# Patient Record
Sex: FEMALE | Race: ASIAN | Employment: FULL TIME | URBAN - METROPOLITAN AREA
[De-identification: names, ages, dates, MRNs, and addresses within clinical notes are randomized per-mention and may not be internally consistent; named-entity substitution may affect disease eponyms.]

---

## 2017-01-16 ENCOUNTER — TELEPHONE (OUTPATIENT)
Dept: OBGYN | Facility: CLINIC | Age: 42
End: 2017-01-16

## 2017-01-16 DIAGNOSIS — R30.0 BURNING WITH URINATION: ICD-10-CM

## 2017-01-16 NOTE — TELEPHONE ENCOUNTER
Pt requesting lab slip for Urine Analysis  C/o burning with urination   Pended UA orders  Routed to Dr Cox

## 2017-01-19 ENCOUNTER — TELEPHONE (OUTPATIENT)
Dept: OBGYN | Facility: CLINIC | Age: 42
End: 2017-01-19

## 2017-01-19 NOTE — TELEPHONE ENCOUNTER
----- Message from Babar Cox M.D. sent at 1/18/2017 12:20 PM PST -----  Repeort normal urine culture. If stil symptomatic , recommend Azo-standard OTC a, cranberry jucie

## 2017-02-16 ENCOUNTER — OFFICE VISIT (OUTPATIENT)
Dept: BEHAVIORAL HEALTH | Facility: PHYSICIAN GROUP | Age: 42
End: 2017-02-16
Payer: COMMERCIAL

## 2017-02-16 VITALS
DIASTOLIC BLOOD PRESSURE: 94 MMHG | BODY MASS INDEX: 26.63 KG/M2 | WEIGHT: 156 LBS | HEART RATE: 81 BPM | HEIGHT: 64 IN | SYSTOLIC BLOOD PRESSURE: 136 MMHG

## 2017-02-16 DIAGNOSIS — F32.0 MDD (MAJOR DEPRESSIVE DISORDER), SINGLE EPISODE, MILD (HCC): Primary | ICD-10-CM

## 2017-02-16 PROBLEM — F33.0 MDD (MAJOR DEPRESSIVE DISORDER), RECURRENT EPISODE, MILD (HCC): Status: RESOLVED | Noted: 2017-02-16 | Resolved: 2017-02-16

## 2017-02-16 PROBLEM — F33.0 MDD (MAJOR DEPRESSIVE DISORDER), RECURRENT EPISODE, MILD (HCC): Status: ACTIVE | Noted: 2017-02-16

## 2017-02-16 PROCEDURE — 99204 OFFICE O/P NEW MOD 45 MIN: CPT | Performed by: PSYCHIATRY & NEUROLOGY

## 2017-02-16 RX ORDER — FLUOXETINE HYDROCHLORIDE 20 MG/1
20 CAPSULE ORAL DAILY
Qty: 30 CAP | Refills: 1 | Status: SHIPPED | OUTPATIENT
Start: 2017-02-16 | End: 2017-07-05

## 2017-02-16 NOTE — PROGRESS NOTES
"INITIAL PSYCHIATRY EVALUATION      Chief Complaint   Patient presents with   • Establish Care   • Depression         History Of Present Illness:  Nancy Carcamo is a 41 y.o. old female with no prior psychiatric history comes in today for evaluation of depression. She endorses feeling more irritable and agitated at least for the last year or so. She feels that she might be \"bipolar\" because of her mood swings. She gets more irritable with her kids and feels that she has lost her patience with them. She endorses significant marital stress. She has been  for 19 years and her  went to [place] for 2 years where he had an affair. He has been back for about 2 years now and that is when she found out. But he refuses to talk to her about that affair and acts like nothing has happened. She feels that the trust and the marriage has been lost but she wants to still try for the sake of kids. She has thought about divorce and has really good support from her parents and siblings but her  does not want that. Her  has problems with alcohol as well but he is not in treatment for that as well. She feels that in the last 2 years her marriage has really deteriorated into a fight a lot, \"we are more like roommates now\". Her kids and family are aware of the marital problems. She endorses feeling more irritable than depressed. She also reports feeling more emotional and having crying spells, struggling with energy and poor motivation. Denies anhedonia, enjoys dancing and spending time with her family. Appetite and sleep have been good. Denies any problems making decisions for herself. She recently had a breakdown at work and had to come back home early because of that. Denies any self-harm behaviors. Denies passive or active thoughts of wanting to hurt herself or others. Denies prior current symptoms of anxiety, hypomania/chantel or psychosis.    Current psychiatric medications - None    Past Psychiatric " History:  Denies history of suicide attempt or prior inpatient psychiatry hospitalization.  Previous medication trials - None    Past Medical/Surgical History:  Past Medical History   Diagnosis Date   • Diabetes 2006     With last pregnancy    • Snoring    • Cyst, ovarian      and fibroids   • OAB (overactive bladder) 10/18/2016     Past Surgical History   Procedure Laterality Date   • Cystectomy  2002     ovarian   • Ovarian cystectomy Bilateral 8/4/2016     Procedure: CYSTOSCOPY;  Surgeon: Delilah Ware M.D.;  Location: SURGERY SAME DAY [facility] ORS;  Service:    • Vaginal hysterectomy scope total Bilateral 8/4/2016     Procedure: VAGINAL HYSTERECTOMY SCOPE TOTAL, EUA, LSO & RT SALPINGECTOMY;  Surgeon: Delilah Ware M.D.;  Location: SURGERY SAME DAY [facility] ORS;  Service:        Family Psychiatric History:  17 yo daughter - anxiety, not currently on medications   16 yo daughter - depression, Prozac  Brother - possible schizophrenia    Substance Use/Addiction History:  Alcohol - Infrequent drinking  Nicotine - Denies  Illicit drugs - Denies    Social History:  Childhood - Born in [place] and moved to [place] when she was a teenager. She is the oldest of 5 siblings and has 3 younger sisters and a younger brother. She reports having a good childhood.  Employment - Employed at [employer] x 9 years. 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  Relationship/Kids -  x 19 years and has 4 daughters and one son.   Current living situation - Lives with her  and kids and Hector.    Allergies:  Nkda    Medications:  Current Outpatient Prescriptions   Medication Sig Dispense Refill   • fluoxetine (PROZAC) 20 MG Cap Take 1 Cap by mouth every day. 30 Cap 1   • tolterodine ER (DETROL LA) 4 MG CAPSULE SR 24 HR Take 1 Cap by mouth every day. 30 Cap 3   • bacitracin 500 UNIT/GM ointment Apply 1 Each to affected area(s) 2 times a day. 1 Tube 1     No current facility-administered medications for  "this visit.       Review of Symptoms:  Constitutional - Positive for fatigue  Eyes - Negative for blurry vision  HEENT - Negative for sore throat  Respiratory - Negative for shortness of breath, cough  CVS - Negative for chest pain, palpitations  GI - Negative for nausea, vomiting, abdominal pain, diarrhea, constipation  Skin - Negative for rash  Musculoskeletal - Negative for back pain  Neurological - Negative for headaches  Psychiatric - Positive for depression    Physical Examination:  Vital signs: /94 mmHg  Pulse 81  Ht 1.626 m (5' 4\")  Wt 70.761 kg (156 lb)  BMI 26.76 kg/m2  LMP 03/23/2011    Musculoskeletal: Normal gait. No abnormal movements.     Mental Status Evaluation:   General: Middle aged  female, tearful several times, dressed in casual attire, good grooming and hygiene, in no apparent distress, calm and cooperative, good eye contact, no psychomotor agitation or retardation  Orientation: Alert and oriented to person, place and time  Recent and remote memory: Intact  Attention span and concentration: Intact  Speech: Spontaneous, normal rate, rhythm and tone  Thought Process: Linear, logical and goal directed  Thought Content: Denies suicidal or homicidal ideations, intent or plan  Perception: Denies auditory or visual hallucinations. No delusions noted  Associations: Intact  Language: Appropriate  Fund of knowledge and vocabulary: Adequate  Mood: \"fine\"  Affect: Dysphroic, mood congruent  Insight: Good  Judgment: Good    Impression:  1. Major depressive disorder, single episode, mild    Medical Records/Labs/Diagnostic Tests Reviewed:  [place] [facility] records - one prescription of controledl medication in 2016    Plan:  1. Start Prozac 20 mg daily for depression. Discussed for 6 week period to assess for efficacy. Discussed side effects including nausea/vomiting, abdominal discomfort/pain, diarrhea, headaches, drowsiness, sleep disturbances, initial worsening of anxiety symptoms, decreased libido, " difficulty achieving orgasm etc.  2. Discussed marital counseling and she is agreeable and will talk to her  about the same. Her marital stress seems to be contributing a lot to her mood and she would benefit from marital counseling.    Return to clinic in 6 weeks or sooner if symptoms worsen    Sulema Alejandro M.D.  02/16/2017    This note was created using voice recognition software (Dragon). The accuracy of the dictation is limited by the abilities of the software. I have reviewed the note prior to signing, however some errors in grammar and context are still possible. If you have any questions related to this note please do not hesitate to contact our office.

## 2017-02-28 ENCOUNTER — HOSPITAL ENCOUNTER (OUTPATIENT)
Dept: LAB | Facility: MEDICAL CENTER | Age: 42
End: 2017-02-28
Attending: FAMILY MEDICINE
Payer: COMMERCIAL

## 2017-02-28 LAB
25(OH)D3 SERPL-MCNC: 14 NG/ML (ref 30–100)
ALBUMIN SERPL BCP-MCNC: 4.5 G/DL (ref 3.2–4.9)
ALBUMIN/GLOB SERPL: 1.2 G/DL
ALP SERPL-CCNC: 62 U/L (ref 30–99)
ALT SERPL-CCNC: 19 U/L (ref 2–50)
ANION GAP SERPL CALC-SCNC: 9 MMOL/L (ref 0–11.9)
AST SERPL-CCNC: 19 U/L (ref 12–45)
BILIRUB SERPL-MCNC: 0.7 MG/DL (ref 0.1–1.5)
BUN SERPL-MCNC: 8 MG/DL (ref 8–22)
CALCIUM SERPL-MCNC: 9.8 MG/DL (ref 8.5–10.5)
CHLORIDE SERPL-SCNC: 105 MMOL/L (ref 96–112)
CO2 SERPL-SCNC: 24 MMOL/L (ref 20–33)
CREAT SERPL-MCNC: 0.64 MG/DL (ref 0.5–1.4)
ERYTHROCYTE [DISTWIDTH] IN BLOOD BY AUTOMATED COUNT: 47.8 FL (ref 35.9–50)
EST. AVERAGE GLUCOSE BLD GHB EST-MCNC: 140 MG/DL
GLOBULIN SER CALC-MCNC: 3.9 G/DL (ref 1.9–3.5)
GLUCOSE SERPL-MCNC: 113 MG/DL (ref 65–99)
HBA1C MFR BLD: 6.5 % (ref 0–5.6)
HCT VFR BLD AUTO: 44.7 % (ref 37–47)
HGB BLD-MCNC: 14.2 G/DL (ref 12–16)
MCH RBC QN AUTO: 27.3 PG (ref 27–33)
MCHC RBC AUTO-ENTMCNC: 31.8 G/DL (ref 33.6–35)
MCV RBC AUTO: 86 FL (ref 81.4–97.8)
PLATELET # BLD AUTO: 358 K/UL (ref 164–446)
PMV BLD AUTO: 11 FL (ref 9–12.9)
POTASSIUM SERPL-SCNC: 3.4 MMOL/L (ref 3.6–5.5)
PROT SERPL-MCNC: 8.4 G/DL (ref 6–8.2)
RBC # BLD AUTO: 5.2 M/UL (ref 4.2–5.4)
SODIUM SERPL-SCNC: 138 MMOL/L (ref 135–145)
TSH SERPL DL<=0.005 MIU/L-ACNC: 2.45 UIU/ML (ref 0.3–3.7)
WBC # BLD AUTO: 8 K/UL (ref 4.8–10.8)

## 2017-02-28 PROCEDURE — 83695 ASSAY OF LIPOPROTEIN(A): CPT

## 2017-02-28 PROCEDURE — 84443 ASSAY THYROID STIM HORMONE: CPT

## 2017-02-28 PROCEDURE — 80061 LIPID PANEL: CPT

## 2017-02-28 PROCEDURE — 83036 HEMOGLOBIN GLYCOSYLATED A1C: CPT

## 2017-02-28 PROCEDURE — 82306 VITAMIN D 25 HYDROXY: CPT

## 2017-02-28 PROCEDURE — 80053 COMPREHEN METABOLIC PANEL: CPT

## 2017-02-28 PROCEDURE — 83704 LIPOPROTEIN BLD QUAN PART: CPT

## 2017-02-28 PROCEDURE — 85027 COMPLETE CBC AUTOMATED: CPT

## 2017-02-28 PROCEDURE — 36415 COLL VENOUS BLD VENIPUNCTURE: CPT

## 2017-03-02 LAB — LPA SERPL-MCNC: 58 MG/DL

## 2017-03-03 LAB
CHOLEST SERPL-MCNC: 177 MG/DL (ref 100–199)
HDL PARTICAL NO Q4363: 25.9 UMOL/L
HDL SERPL QN: 8.5 NM
HDLC SERPL-MCNC: 34 MG/DL
HLD.LARGE SERPL-SCNC: 1.6 UMOL/L
LDL MED SERPL QN: 20.6 NM
LDL SERPL QN: 20.6 NM
LDL SERPL-SCNC: 1488 NMOL/L
LDL SMALL SERPL-SCNC: 643 NMOL/L
LDL SMALL SERPL-SCNC: 643 NMOL/L
LDLC SERPL CALC-MCNC: 122 MG/DL (ref 0–99)
LP IR SCORE Q4364: 74
TRIGL SERPL-MCNC: 104 MG/DL (ref 0–149)
VLDL LARGE SERPL-SCNC: 5.1 NMOL/L
VLDL SERPL QN: 47.4 NM

## 2017-03-15 ENCOUNTER — APPOINTMENT (OUTPATIENT)
Dept: PLASTIC SURGERY | Facility: IMAGING CENTER | Age: 42
End: 2017-03-15

## 2017-04-03 ENCOUNTER — OFFICE VISIT (OUTPATIENT)
Dept: MEDICAL GROUP | Facility: PHYSICIAN GROUP | Age: 42
End: 2017-04-03
Payer: COMMERCIAL

## 2017-04-03 ENCOUNTER — TELEPHONE (OUTPATIENT)
Dept: MEDICAL GROUP | Facility: PHYSICIAN GROUP | Age: 42
End: 2017-04-03

## 2017-04-03 VITALS
HEART RATE: 85 BPM | RESPIRATION RATE: 16 BRPM | SYSTOLIC BLOOD PRESSURE: 118 MMHG | HEIGHT: 64 IN | OXYGEN SATURATION: 96 % | DIASTOLIC BLOOD PRESSURE: 84 MMHG | BODY MASS INDEX: 24.92 KG/M2 | WEIGHT: 146 LBS | TEMPERATURE: 98.4 F

## 2017-04-03 DIAGNOSIS — E55.9 VITAMIN D DEFICIENCY: ICD-10-CM

## 2017-04-03 DIAGNOSIS — E78.5 HYPERLIPIDEMIA, UNSPECIFIED HYPERLIPIDEMIA TYPE: ICD-10-CM

## 2017-04-03 DIAGNOSIS — E11.9 NON-INSULIN DEPENDENT TYPE 2 DIABETES MELLITUS (HCC): ICD-10-CM

## 2017-04-03 PROCEDURE — 99214 OFFICE O/P EST MOD 30 MIN: CPT | Performed by: INTERNAL MEDICINE

## 2017-04-03 RX ORDER — LANCETS 30 GAUGE
EACH MISCELLANEOUS
Qty: 100 EACH | Refills: 3 | Status: SHIPPED | OUTPATIENT
Start: 2017-04-03 | End: 2021-08-20

## 2017-04-03 ASSESSMENT — PATIENT HEALTH QUESTIONNAIRE - PHQ9: CLINICAL INTERPRETATION OF PHQ2 SCORE: 0

## 2017-04-03 NOTE — MR AVS SNAPSHOT
"        Nancy Carcamo   4/3/2017 1:25 PM   Office Visit   MRN: [id number]    Department:  [facility]   Dept Phone:  [phone]    Description:  Female : 1975   Provider:  Natalie Cesar D.O.           Reason for Visit     Follow-Up Lab review      Allergies as of 4/3/2017     Allergen Noted Reactions    Nkda [No Known Drug Allergy] 2010         You were diagnosed with     Non-insulin dependent type 2 diabetes mellitus (CMS-HCC)   [315330]       Hyperlipidemia, unspecified hyperlipidemia type   [5968711]       Vitamin D deficiency   [3429146]         Vital Signs     Blood Pressure Pulse Temperature Respirations Height Weight    118/84 mmHg 85 36.9 °C (98.4 °F) 16 1.626 m (5' 4\") 66.225 kg (146 lb)    Body Mass Index Oxygen Saturation Last Menstrual Period Breastfeeding? Smoking Status       25.05 kg/m2 96% 2011 No Never Smoker        Basic Information     Date Of Birth Sex Race Ethnicity Preferred Language    1975 Female  Unknown English      Problem List              ICD-10-CM Priority Class Noted - Resolved    Vitamin D deficiency E55.9   2015 - Present    Ovarian retention cyst N83.209   2016 - Present    Menorrhagia, premenopausal N92.4   2016 - Present    Pelvic and perineal pain R10.2   2016 - Present    OAB (overactive bladder) N32.81   10/18/2016 - Present    MDD (major depressive disorder), single episode, mild (CMS-HCC) F32.0   2017 - Present      Health Maintenance        Date Due Completion Dates    IMM DTaP/Tdap/Td Vaccine (1 - Tdap) 3/27/1992 ---    PAP SMEAR 2017, 1/10/2012, 3/11/2011    MAMMOGRAM 2017, 10/18/2016 (Done), 2015, 2014    Override on 10/18/2016: Done            Current Immunizations     Influenza TIV (IM) 2012, 12/10/2011  5:00 AM    Influenza Vaccine Quad Inj (Pf) 2016, 2014    Influenza Vaccine Quad Inj (Preserved) 2015    Tuberculin Skin Test 2014      "   Below and/or attached are the medications your provider expects you to take. Review all of your home medications and newly ordered medications with your provider and/or pharmacist. Follow medication instructions as directed by your provider and/or pharmacist. Please keep your medication list with you and share with your provider. Update the information when medications are discontinued, doses are changed, or new medications (including over-the-counter products) are added; and carry medication information at all times in the event of emergency situations     Allergies:  NKDA - (reactions not documented)               Medications  Valid as of: April 03, 2017 -  2:09 PM    Generic Name Brand Name Tablet Size Instructions for use    Bacitracin (Ointment) bacitracin 500 UNIT/GM Apply 1 Each to affected area(s) 2 times a day.        FLUoxetine HCl (Cap) PROZAC 20 MG Take 1 Cap by mouth every day.        MetFORMIN HCl (Tab) GLUCOPHAGE 500 MG Take 1 Tab by mouth 2 times a day, with meals.        Tolterodine Tartrate (CAPSULE SR 24 HR) DETROL LA 4 MG Take 1 Cap by mouth every day.        .                 Medicines prescribed today were sent to:     [id number] - [place], [place] - [address]    [address] [address]    Phone: [phone] Fax: [phone]    Open 24 Hours?: No      Medication refill instructions:       If your prescription bottle indicates you have medication refills left, it is not necessary to call your provider’s office. Please contact your pharmacy and they will refill your medication.    If your prescription bottle indicates you do not have any refills left, you may request refills at any time through one of the following ways: The online [employer] [facility] system (except Urgent Care), by calling your provider’s office, or by asking your pharmacy to contact your provider’s office with a refill request. Medication refills are processed only during regular business hours and may  not be available until the next business day. Your provider may request additional information or to have a follow-up visit with you prior to refilling your medication.   *Please Note: Medication refills are assigned a new Rx number when refilled electronically. Your pharmacy may indicate that no refills were authorized even though a new prescription for the same medication is available at the pharmacy. Please request the medicine by name with the pharmacy before contacting your provider for a refill.        Your To Do List     Future Labs/Procedures Complete By Expires    LIPID PROFILE  7/3/2017 (Approximate) 4/4/2018    VITAMIN D,25 HYDROXY  7/3/2017 (Approximate) 4/4/2018    BASIC METABOLIC PANEL  As directed 4/4/2018    HEMOGLOBIN A1C  As directed 4/4/2018      Referral     A referral request has been sent to our patient care coordination department. Please allow 3-5 business days for us to process this request and contact you either by phone or mail. If you do not hear from us by the 5th business day, please call us at [phone].           Claim Maps Access Code: Activation code not generated  Current Claim Maps Status: Active

## 2017-04-03 NOTE — PROGRESS NOTES
Subjective:   Nancy Carcamo is a 42 y.o. female here today for multiple problems as listed below.      Patient had history of impaired fasting glucose. Had lab tests ordered through [facility] program at [facility].  Here to review lab results. A1C is elevated compared to previous.  Lab Results   Component Value Date/Time    GLYCOHEMOGLOBIN 6.5* 02/28/2017 08:24 AM    GLYCOHEMOGLOBIN 6.3* 12/22/2015 05:33 PM   Patient has history of gestational diabetes with her last 2 pregnancies-last one in 2010. No family history of diabetes. Through the [facility] program, she has been working out 3 times a week with some intense workouts (P90 X) as well as walking about 30 minutes during her lunch break. He is also been watching her diet more closely-she doesn't eat out and makes her lunches and dinners. She has been trying to lose weight and lost about 10 pounds so far with the program. She's not sure if she is able to continue lose weight but hopes that she can reach her goal weight of 140 pounds. She is interested in weight loss medications. She is following with a nutritionist through the program. Thyroid tests were within normal limits with 2/2017 labs.  Patients most recent cholesterol was reviewed:  Lab Results   Component Value Date/Time    CHOLESTEROL, 02/28/2017 08:24 AM    CHOLESTEROL, 08/21/2015 09:21 AM     Lab Results   Component Value Date/Time    * 08/21/2015 09:21 AM    LDL 95 07/22/2014 08:08 AM     Lab Results   Component Value Date/Time    HDL 34* 02/28/2017 08:24 AM    HDL 32* 08/21/2015 09:21 AM     Lab Results   Component Value Date/Time    TRIGLYCERIDES 104 02/28/2017 08:24 AM    TRIGLYCERIDES 95 08/21/2015 09:21 AM       Lab Results   Component Value Date/Time    25-HYDROXY   VITAMIN D 25 14* 02/28/2017 08:24 AM    25-HYDROXY   VITAMIN D 25 16* 08/21/2015 09:21 AM       Current medicines (including changes today)  Current Outpatient Prescriptions   Medication Sig Dispense Refill   •  "metformin (GLUCOPHAGE) 500 MG Tab Take 1 Tab by mouth 2 times a day, with meals. 60 Tab 2   • Lancets Misc Lancets order: Lancets for Abbott Freestyle Lite meter. Sig: use daily and prn ssx high or low sugar. 100 Each 3   • Blood Glucose Monitoring Suppl SUPPLIES Misc Test strips order: Test strips for Abbott Freestyle Lite meter. Sig: use Daily and prn ssx high or low sugar 100 Each 3   • Blood Glucose Monitoring Suppl Device Meter: Dispense Abbott Freestyle Lite meter. Sig. Use daily as directed for blood sugar monitoring. 1 Device 0   • tolterodine ER (DETROL LA) 4 MG CAPSULE SR 24 HR Take 1 Cap by mouth every day. 30 Cap 3   • fluoxetine (PROZAC) 20 MG Cap Take 1 Cap by mouth every day. 30 Cap 1   • bacitracin 500 UNIT/GM ointment Apply 1 Each to affected area(s) 2 times a day. 1 Tube 1     No current facility-administered medications for this visit.     She  has a past medical history of Diabetes (2006); Snoring; Cyst, ovarian; and OAB (overactive bladder) (10/18/2016).    ROS   No chest pain, no shortness of breath, no abdominal pain, no diarrhea/constipation, no urinary symptoms.     Objective:     Blood pressure 118/84, pulse 85, temperature 36.9 °C (98.4 °F), resp. rate 16, height 1.626 m (5' 4\"), weight 66.225 kg (146 lb), last menstrual period 03/23/2011, SpO2 96 %, not currently breastfeeding. Body mass index is 25.05 kg/(m^2).   Physical Exam:  Alert, oriented in no acute distress.  Eye contact is good, speech goal directed, affect calm  HEENT: conjunctiva non-injected, sclera non-icteric.  Oral mucous membranes pink and moist with no lesions.  Neck No adenopathy or masses in the neck or supraclavicular regions.  Lungs: clear to auscultation bilaterally with good excursion.  CV: regular rate and rhythm.  Ext: no edema, color normal, vascularity normal, temperature normal    Assessment and Plan:   The following treatment plan was discussed   1. Non-insulin dependent type 2 diabetes mellitus (CMS-HCC)  " HEMOGLOBIN A1C    BASIC METABOLIC PANEL    REFERRAL TO DIABETIC EDUCATION Diabetes Self Management Education / Training (DSME/T) and Medical Nutrition Therapy (MNT): Initial Group DSME/MNT as authorized by payor, Follow-Up DSME/MNT as authorized by payor; DSME/T Content: Monitoring Diabetes,     metformin (GLUCOPHAGE) 500 MG Tab    Lancets Misc    Blood Glucose Monitoring Suppl SUPPLIES Misc    Blood Glucose Monitoring Suppl Device   2. Hyperlipidemia, unspecified hyperlipidemia type  LIPID PROFILE   3. Vitamin D deficiency  VITAMIN D,25 HYDROXY     1. New diagnosis of diabetes however, we will repeat A1C and fasting sugars to confirm.   Discussed diet, exercise, disease management and weight loss goals.   Education and advise provided today:   All medications, side effects and compliance (discussed carefully)  Home glucose monitoring  Diabetic diet discussed in detail  Labs immediately prior to next visit  Long term diabetic complications  Low cholesterol diet, weight control and daily exercise  Referral to Diabetic Education Department  -Patient may experience weight loss with starting metformin  2. Recheck fasting lipid panel in 3 months. Discussed healthy diet regular physical activity.  3. Start 1500 units vitamin D3 daily. Recheck vitamin D levels in 3 months.    Followup:     Followup: Return in about 3 months (around 7/3/2017) for follow-up with PCP, Lab review, establish appointment with new provider..         Please note that dictation has been dictated using voice recognition soft ware. I have made every reasonable attempt to correct obvious errors, but I expect that there are errors of grammar and possibly content that I did not discover before finalizing the note.

## 2017-04-03 NOTE — TELEPHONE ENCOUNTER
Please inform patient I forgot to mention that I also sent script to her pharmacy for blood glucose monitoring supplies - this can be reviewed in detail at diabetic education class and with pharmacist on how to use it.

## 2017-04-04 DIAGNOSIS — E66.9 OBESITY, UNSPECIFIED OBESITY SEVERITY, UNSPECIFIED OBESITY TYPE: ICD-10-CM

## 2017-04-04 RX ORDER — PHENTERMINE HYDROCHLORIDE 37.5 MG/1
37.5 TABLET ORAL
Qty: 30 TAB | Refills: 2 | Status: SHIPPED
Start: 2017-04-04 | End: 2021-08-20

## 2017-04-07 ENCOUNTER — HOSPITAL ENCOUNTER (OUTPATIENT)
Dept: LAB | Facility: MEDICAL CENTER | Age: 42
End: 2017-04-07
Attending: INTERNAL MEDICINE
Payer: COMMERCIAL

## 2017-04-07 ENCOUNTER — APPOINTMENT (OUTPATIENT)
Dept: LAB | Facility: MEDICAL CENTER | Age: 42
End: 2017-04-07
Payer: COMMERCIAL

## 2017-04-07 DIAGNOSIS — E11.9 NON-INSULIN DEPENDENT TYPE 2 DIABETES MELLITUS (HCC): ICD-10-CM

## 2017-04-07 DIAGNOSIS — R73.01 IMPAIRED FASTING GLUCOSE: ICD-10-CM

## 2017-04-07 LAB
ANION GAP SERPL CALC-SCNC: 7 MMOL/L (ref 0–11.9)
BUN SERPL-MCNC: 10 MG/DL (ref 8–22)
CALCIUM SERPL-MCNC: 9.5 MG/DL (ref 8.5–10.5)
CHLORIDE SERPL-SCNC: 104 MMOL/L (ref 96–112)
CO2 SERPL-SCNC: 27 MMOL/L (ref 20–33)
CREAT SERPL-MCNC: 0.75 MG/DL (ref 0.5–1.4)
EST. AVERAGE GLUCOSE BLD GHB EST-MCNC: 131 MG/DL
GFR SERPL CREATININE-BSD FRML MDRD: >60 ML/MIN/1.73 M 2
GLUCOSE SERPL-MCNC: 104 MG/DL (ref 65–99)
HBA1C MFR BLD: 6.2 % (ref 0–5.6)
POTASSIUM SERPL-SCNC: 4.2 MMOL/L (ref 3.6–5.5)
SODIUM SERPL-SCNC: 138 MMOL/L (ref 135–145)

## 2017-04-07 PROCEDURE — 83036 HEMOGLOBIN GLYCOSYLATED A1C: CPT

## 2017-04-07 PROCEDURE — 80048 BASIC METABOLIC PNL TOTAL CA: CPT

## 2017-04-07 PROCEDURE — 36415 COLL VENOUS BLD VENIPUNCTURE: CPT

## 2017-05-08 ENCOUNTER — TELEPHONE (OUTPATIENT)
Dept: OBGYN | Facility: CLINIC | Age: 42
End: 2017-05-08

## 2017-05-08 ENCOUNTER — HOSPITAL ENCOUNTER (OUTPATIENT)
Dept: LAB | Facility: MEDICAL CENTER | Age: 42
End: 2017-05-08
Attending: PEDIATRICS
Payer: COMMERCIAL

## 2017-05-08 DIAGNOSIS — R39.9 UTI SYMPTOMS: ICD-10-CM

## 2017-05-08 LAB
APPEARANCE UR: ABNORMAL
BACTERIA #/AREA URNS HPF: ABNORMAL /HPF
BILIRUB UR QL STRIP.AUTO: NEGATIVE
COLOR UR: YELLOW
CULTURE IF INDICATED INDCX: YES UA CULTURE
EPI CELLS #/AREA URNS HPF: ABNORMAL /HPF
GLUCOSE UR STRIP.AUTO-MCNC: NEGATIVE MG/DL
KETONES UR STRIP.AUTO-MCNC: NEGATIVE MG/DL
LEUKOCYTE ESTERASE UR QL STRIP.AUTO: ABNORMAL
MICRO URNS: ABNORMAL
MUCOUS THREADS #/AREA URNS HPF: ABNORMAL /HPF
NITRITE UR QL STRIP.AUTO: POSITIVE
PH UR STRIP.AUTO: 6.5 [PH]
PROT UR QL STRIP: 30 MG/DL
RBC # URNS HPF: ABNORMAL /HPF
RBC UR QL AUTO: ABNORMAL
SP GR UR STRIP.AUTO: 1.01
WBC #/AREA URNS HPF: >150 /HPF

## 2017-05-08 PROCEDURE — 87086 URINE CULTURE/COLONY COUNT: CPT

## 2017-05-08 PROCEDURE — 87077 CULTURE AEROBIC IDENTIFY: CPT

## 2017-05-08 PROCEDURE — 81001 URINALYSIS AUTO W/SCOPE: CPT

## 2017-05-08 PROCEDURE — 87186 SC STD MICRODIL/AGAR DIL: CPT

## 2017-05-09 ENCOUNTER — TELEPHONE (OUTPATIENT)
Dept: OBGYN | Facility: CLINIC | Age: 42
End: 2017-05-09

## 2017-05-09 DIAGNOSIS — N39.0 URINARY TRACT INFECTION WITHOUT HEMATURIA, SITE UNSPECIFIED: ICD-10-CM

## 2017-05-09 RX ORDER — NITROFURANTOIN 25; 75 MG/1; MG/1
100 CAPSULE ORAL 2 TIMES DAILY
Qty: 14 CAP | Refills: 0 | Status: SHIPPED | OUTPATIENT
Start: 2017-05-09 | End: 2017-07-05

## 2017-05-09 RX ORDER — PHENAZOPYRIDINE HYDROCHLORIDE 200 MG/1
200 TABLET, FILM COATED ORAL 3 TIMES DAILY PRN
Qty: 6 TAB | Refills: 0 | Status: SHIPPED | OUTPATIENT
Start: 2017-05-09 | End: 2017-07-05

## 2017-05-10 LAB
BACTERIA UR CULT: ABNORMAL
SIGNIFICANT IND 70042: ABNORMAL
SOURCE SOURCE: ABNORMAL

## 2017-05-25 ENCOUNTER — HOSPITAL ENCOUNTER (OUTPATIENT)
Facility: MEDICAL CENTER | Age: 42
End: 2017-05-25
Attending: FAMILY MEDICINE
Payer: COMMERCIAL

## 2017-05-25 LAB — GFR SERPL CREATININE-BSD FRML MDRD: >60 ML/MIN/1.73 M 2

## 2017-05-25 PROCEDURE — 80061 LIPID PANEL: CPT

## 2017-05-25 PROCEDURE — 80053 COMPREHEN METABOLIC PANEL: CPT

## 2017-05-25 PROCEDURE — 85027 COMPLETE CBC AUTOMATED: CPT

## 2017-05-25 PROCEDURE — 83036 HEMOGLOBIN GLYCOSYLATED A1C: CPT

## 2017-05-25 PROCEDURE — 82306 VITAMIN D 25 HYDROXY: CPT

## 2017-05-25 PROCEDURE — 84443 ASSAY THYROID STIM HORMONE: CPT

## 2017-05-25 PROCEDURE — 83704 LIPOPROTEIN BLD QUAN PART: CPT

## 2017-05-25 PROCEDURE — 83695 ASSAY OF LIPOPROTEIN(A): CPT

## 2017-05-26 LAB
25(OH)D3 SERPL-MCNC: 12 NG/ML (ref 30–100)
ALBUMIN SERPL BCP-MCNC: 4.4 G/DL (ref 3.2–4.9)
ALBUMIN/GLOB SERPL: 1.3 G/DL
ALP SERPL-CCNC: 60 U/L (ref 30–99)
ALT SERPL-CCNC: 23 U/L (ref 2–50)
ANION GAP SERPL CALC-SCNC: 9 MMOL/L (ref 0–11.9)
AST SERPL-CCNC: 21 U/L (ref 12–45)
BILIRUB SERPL-MCNC: 0.4 MG/DL (ref 0.1–1.5)
BUN SERPL-MCNC: 10 MG/DL (ref 8–22)
CALCIUM SERPL-MCNC: 9.8 MG/DL (ref 8.5–10.5)
CHLORIDE SERPL-SCNC: 104 MMOL/L (ref 96–112)
CO2 SERPL-SCNC: 26 MMOL/L (ref 20–33)
CREAT SERPL-MCNC: 0.72 MG/DL (ref 0.5–1.4)
ERYTHROCYTE [DISTWIDTH] IN BLOOD BY AUTOMATED COUNT: 43.7 FL (ref 35.9–50)
EST. AVERAGE GLUCOSE BLD GHB EST-MCNC: 128 MG/DL
GLOBULIN SER CALC-MCNC: 3.4 G/DL (ref 1.9–3.5)
GLUCOSE SERPL-MCNC: 106 MG/DL (ref 65–99)
HBA1C MFR BLD: 6.1 % (ref 0–5.6)
HCT VFR BLD AUTO: 42.5 % (ref 37–47)
HGB BLD-MCNC: 13.1 G/DL (ref 12–16)
MCH RBC QN AUTO: 25.7 PG (ref 27–33)
MCHC RBC AUTO-ENTMCNC: 30.8 G/DL (ref 33.6–35)
MCV RBC AUTO: 83.5 FL (ref 81.4–97.8)
PLATELET # BLD AUTO: 347 K/UL (ref 164–446)
PMV BLD AUTO: 10.8 FL (ref 9–12.9)
POTASSIUM SERPL-SCNC: 3.5 MMOL/L (ref 3.6–5.5)
PROT SERPL-MCNC: 7.8 G/DL (ref 6–8.2)
RBC # BLD AUTO: 5.09 M/UL (ref 4.2–5.4)
SODIUM SERPL-SCNC: 139 MMOL/L (ref 135–145)
TSH SERPL DL<=0.005 MIU/L-ACNC: 3.23 UIU/ML (ref 0.3–3.7)
WBC # BLD AUTO: 7.9 K/UL (ref 4.8–10.8)

## 2017-05-27 LAB — LPA SERPL-MCNC: 68 MG/DL

## 2017-05-30 LAB
CHOLEST SERPL-MCNC: 182 MG/DL (ref 100–199)
HDL PARTICAL NO Q4363: 26.7 UMOL/L
HDL SERPL QN: 8.4 NM
HDLC SERPL-MCNC: 39 MG/DL
HLD.LARGE SERPL-SCNC: 1.9 UMOL/L
LDL MED SERPL QN: 20.8 NM
LDL SERPL QN: 20.8 NM
LDL SERPL-SCNC: 1606 NMOL/L
LDL SMALL SERPL-SCNC: 827 NMOL/L
LDL SMALL SERPL-SCNC: 827 NMOL/L
LDLC SERPL CALC-MCNC: 122 MG/DL (ref 0–99)
LP IR SCORE Q4364: 65
TRIGL SERPL-MCNC: 106 MG/DL (ref 0–149)
VLDL LARGE SERPL-SCNC: 3.7 NMOL/L
VLDL SERPL QN: 42.9 NM

## 2017-07-05 ENCOUNTER — OFFICE VISIT (OUTPATIENT)
Dept: MEDICAL GROUP | Facility: PHYSICIAN GROUP | Age: 42
End: 2017-07-05
Payer: COMMERCIAL

## 2017-07-05 VITALS
DIASTOLIC BLOOD PRESSURE: 82 MMHG | OXYGEN SATURATION: 96 % | SYSTOLIC BLOOD PRESSURE: 112 MMHG | WEIGHT: 141 LBS | HEART RATE: 86 BPM | TEMPERATURE: 97.9 F | HEIGHT: 64 IN | BODY MASS INDEX: 24.07 KG/M2

## 2017-07-05 DIAGNOSIS — E11.9 CONTROLLED TYPE 2 DIABETES MELLITUS WITHOUT COMPLICATION, WITHOUT LONG-TERM CURRENT USE OF INSULIN (HCC): ICD-10-CM

## 2017-07-05 DIAGNOSIS — E55.9 VITAMIN D DEFICIENCY: ICD-10-CM

## 2017-07-05 DIAGNOSIS — E11.69 DYSLIPIDEMIA ASSOCIATED WITH TYPE 2 DIABETES MELLITUS (HCC): ICD-10-CM

## 2017-07-05 DIAGNOSIS — E78.5 DYSLIPIDEMIA ASSOCIATED WITH TYPE 2 DIABETES MELLITUS (HCC): ICD-10-CM

## 2017-07-05 PROCEDURE — 99214 OFFICE O/P EST MOD 30 MIN: CPT | Performed by: NURSE PRACTITIONER

## 2017-07-05 RX ORDER — ERGOCALCIFEROL 1.25 MG/1
50000 CAPSULE ORAL
Qty: 12 CAP | Refills: 0 | Status: SHIPPED | OUTPATIENT
Start: 2017-07-05 | End: 2017-08-02 | Stop reason: SDUPTHER

## 2017-07-05 RX ORDER — ATORVASTATIN CALCIUM 20 MG/1
20 TABLET, FILM COATED ORAL DAILY
Qty: 30 TAB | Refills: 5 | Status: SHIPPED | OUTPATIENT
Start: 2017-07-05 | End: 2018-01-09 | Stop reason: SDUPTHER

## 2017-07-05 RX ORDER — METFORMIN HYDROCHLORIDE 500 MG/1
500 TABLET, EXTENDED RELEASE ORAL 2 TIMES DAILY WITH MEALS
Qty: 60 TAB | Refills: 5 | Status: SHIPPED | OUTPATIENT
Start: 2017-07-05 | End: 2017-10-03 | Stop reason: SDUPTHER

## 2017-07-05 NOTE — MR AVS SNAPSHOT
"        Nancy Carcamo   2017 8:55 AM   Office Visit   MRN: [id number]    Department:  [facility]   Dept Phone:  [phone]    Description:  Female : 1975   Provider:  EVAN Multani           Reason for Visit     Follow-Up Lab review      Allergies as of 2017     Allergen Noted Reactions    Nkda [No Known Drug Allergy] 2010         You were diagnosed with     Vitamin D deficiency   [0015828]       Controlled type 2 diabetes mellitus without complication, without long-term current use of insulin (CMS-HCC)   [4678314]       Dyslipidemia associated with type 2 diabetes mellitus (CMS-HCC)   [840215]         Vital Signs     Blood Pressure Pulse Temperature Height Weight Body Mass Index    112/82 mmHg 86 36.6 °C (97.9 °F) 1.626 m (5' 4\") 63.957 kg (141 lb) 24.19 kg/m2    Oxygen Saturation Last Menstrual Period Breastfeeding? Smoking Status          96% 2011 No Never Smoker         Basic Information     Date Of Birth Sex Race Ethnicity Preferred Language    1975 Female  Unknown English      Your appointments     Sep 12, 2017  8:55 AM   Diabetes Care Visit with EVAN Multani, [facility] [facility] RN   [facility])    [address] [place] [address], [facility] [address]  [facility] [address] [phone]           You will be receiving a confirmation call a few days before your appointment from our automated call confirmation system.              Problem List              ICD-10-CM Priority Class Noted - Resolved    Vitamin D deficiency E55.9   2015 - Present    Ovarian retention cyst N83.209   2016 - Present    Menorrhagia, premenopausal N92.4   2016 - Present    Pelvic and perineal pain R10.2   2016 - Present    OAB (overactive bladder) N32.81   10/18/2016 - Present    MDD (major depressive disorder), single episode, mild (CMS-HCC) F32.0   2017 - Present    Controlled type 2 diabetes mellitus without complication, " without long-term current use of insulin (CMS-HCC) E11.9   7/5/2017 - Present    Dyslipidemia associated with type 2 diabetes mellitus (CMS-HCC) E11.69, E78.5   7/5/2017 - Present      Health Maintenance        Date Due Completion Dates    IMM HEP B VACCINE (1 of 3 - Primary Series) 1975 ---    DIABETES MONOFILAMENT / LE EXAM 1975 ---    RETINAL SCREENING 3/27/1991 ---    URINE ACR / MICROALBUMIN 3/27/1991 ---    IMM DTaP/Tdap/Td Vaccine (1 - Tdap) 3/27/1992 ---    FASTING LIPID PROFILE 1/18/2015 1/18/2014, 3/14/2011    PAP SMEAR 7/7/2017 7/7/2014, 1/10/2012, 3/11/2011    IMM INFLUENZA (1) 9/1/2017 11/1/2016, 12/29/2015, 11/12/2014, 11/14/2012, 12/10/2011    MAMMOGRAM 11/22/2017 11/22/2016, 10/18/2016 (Done), 9/11/2015, 7/23/2014    Override on 10/18/2016: Done    A1C SCREENING 11/25/2017 5/25/2017, 4/7/2017, 2/28/2017, 12/22/2015, 8/21/2015, 8/13/2011    SERUM CREATININE 5/25/2018 5/25/2017, 4/7/2017, 2/28/2017, 9/11/2016, 8/3/2016, 8/21/2015, 1/18/2014, 3/14/2011, 10/30/2006            Current Immunizations     Influenza TIV (IM) 11/14/2012, 12/10/2011  5:00 AM    Influenza Vaccine Quad Inj (Pf) 11/1/2016, 11/12/2014    Influenza Vaccine Quad Inj (Preserved) 12/29/2015    Tuberculin Skin Test 4/4/2014      Below and/or attached are the medications your provider expects you to take. Review all of your home medications and newly ordered medications with your provider and/or pharmacist. Follow medication instructions as directed by your provider and/or pharmacist. Please keep your medication list with you and share with your provider. Update the information when medications are discontinued, doses are changed, or new medications (including over-the-counter products) are added; and carry medication information at all times in the event of emergency situations     Allergies:  NKDA - (reactions not documented)               Medications  Valid as of: July 05, 2017 -  9:29 AM    Generic Name Brand Name Tablet  Size Instructions for use    Atorvastatin Calcium (Tab) LIPITOR 20 MG Take 1 Tab by mouth every day.        Blood Glucose Monitoring Suppl (Misc) Blood Glucose Monitoring Suppl SUPPLIES Test strips order: Test strips for Abbott Freestyle Lite meter. Sig: use Daily and prn ssx high or low sugar        Blood Glucose Monitoring Suppl (Device) Blood Glucose Monitoring Suppl  Meter: Dispense Abbott Freestyle Lite meter. Sig. Use daily as directed for blood sugar monitoring.        Ergocalciferol (Cap) DRISDOL 82355 UNITS Take 1 Cap by mouth every 7 days. Labs every three months, do not refill until after labs.        Lancets (Misc) Lancets  Lancets order: Lancets for Abbott Freestyle Lite meter. Sig: use daily and prn ssx high or low sugar.        MetFORMIN HCl (TABLET SR 24 HR) GLUCOPHAGE  MG Take 1 Tab by mouth 2 times a day, with meals.        Phentermine HCl (Tab) ADIPEX-P 37.5 MG Take 1 Tab by mouth every morning before breakfast.        Tolterodine Tartrate (CAPSULE SR 24 HR) DETROL LA 4 MG Take 1 Cap by mouth every day.        .                 Medicines prescribed today were sent to:     [facility]/PHARMACY #[id number] - [place], [place] - [address] [facility]    [address] [facility] [address]    Phone: [phone] Fax: [phone]    Open 24 Hours?: No      Medication refill instructions:       If your prescription bottle indicates you have medication refills left, it is not necessary to call your provider’s office. Please contact your pharmacy and they will refill your medication.    If your prescription bottle indicates you do not have any refills left, you may request refills at any time through one of the following ways: The online [employer] system (except Urgent Care), by calling your provider’s office, or by asking your pharmacy to contact your provider’s office with a refill request. Medication refills are processed only during regular business hours and may not be available until the next business day.  Your provider may request additional information or to have a follow-up visit with you prior to refilling your medication.   *Please Note: Medication refills are assigned a new Rx number when refilled electronically. Your pharmacy may indicate that no refills were authorized even though a new prescription for the same medication is available at the pharmacy. Please request the medicine by name with the pharmacy before contacting your provider for a refill.        Your To Do List     Future Labs/Procedures Complete By Expires    COMP METABOLIC PANEL  As directed 7/6/2018    Comments:    8 hour fast, plain water only    HEMOGLOBIN A1C  As directed 7/6/2018    LIPID PROFILE  As directed 7/6/2018    Comments:    10 hour fast, plain water only    VITAMIN D,25 HYDROXY  As directed 7/6/2018         MyChart Access Code: Activation code not generated  Current [employer] Status: Active

## 2017-07-05 NOTE — PROGRESS NOTES
Subjective:     Chief Complaint   Patient presents with   • Follow-Up     Lab review        Nancy Carcamo is a 42 y.o. female patient of my colleague  here today for evaluation and management of:    Newly diagnosed DM II, unable to attend DM education classes due to work schedule. Has not picked up meter and supplies yet. She is taking metformin 500 mg bid and A1c improved to 6.1 on 5.25 down from 6.2 on April 7 when she started metformin. Completed motivate U program. Reviewed labs today, cholesterol improved but still above goal. Not taking daily vitamin D supplement consistently. Vitamin D level decreased from 14-12 on recent labs.    ROS   Denies HA, chest pain, shortness of breath, abdominal pain, bladder or bowel changes, lower extremity edema.    Current medicines (including changes today)  Current Outpatient Prescriptions   Medication Sig Dispense Refill   • vitamin D, Ergocalciferol, (DRISDOL) 19743 UNITS Cap capsule Take 1 Cap by mouth every 7 days. Labs every three months, do not refill until after labs. 12 Cap 0   • atorvastatin (LIPITOR) 20 MG Tab Take 1 Tab by mouth every day. 30 Tab 5   • metformin ER (GLUCOPHAGE XR) 500 MG TABLET SR 24 HR Take 1 Tab by mouth 2 times a day, with meals. 60 Tab 5   • phentermine (ADIPEX-P) 37.5 MG tablet Take 1 Tab by mouth every morning before breakfast. 30 Tab 2   • tolterodine ER (DETROL LA) 4 MG CAPSULE SR 24 HR Take 1 Cap by mouth every day. 30 Cap 3   • Lancets Misc Lancets order: Lancets for Abbott Freestyle Lite meter. Sig: use daily and prn ssx high or low sugar. 100 Each 3   • Blood Glucose Monitoring Suppl SUPPLIES Misc Test strips order: Test strips for Abbott Freestyle Lite meter. Sig: use Daily and prn ssx high or low sugar 100 Each 3   • Blood Glucose Monitoring Suppl Device Meter: Dispense Abbott Freestyle Lite meter. Sig. Use daily as directed for blood sugar monitoring. 1 Device 0     No current facility-administered medications for this visit.  "      She  has a past medical history of Diabetes (2006); Snoring; Cyst, ovarian; and OAB (overactive bladder) (10/18/2016).    Allergies Nkda    Current medications, problem list, allergies, past medical history, and tobacco use history reviewed in [facility] today.    Health maintenance reviewed and updated.    Objective:   Blood pressure 112/82, pulse 86, temperature 36.6 °C (97.9 °F), height 1.626 m (5' 4\"), weight 63.957 kg (141 lb), last menstrual period 03/23/2011, SpO2 96 %, not currently breastfeeding. Body mass index is 24.19 kg/(m^2).     Physical Exam   Constitutional: Alert, no acute distress. Pleasant and cooperative with the examination.  Skin:   Warm, dry, no rashes in visible areas.    Eyes:   Pupils equal, round. Conjunctiva and sclera clear,    Lids normal.  ENT:  Pinna normal.   Neck:   Supple, trachea midline.  Lungs:  Normal effort and respirations.  CV:  Regular rate.  MS/Ext:  Steady gait, no edema.  Psych:  Eye contact is good, affect calm.    Assessment and Plan:   The following treatment plan was discussed    1. Controlled type 2 diabetes mellitus without complication, without long-term current use of insulin (CMS-[facility])  LIPID PROFILE    HEMOGLOBIN A1C    COMP METABOLIC PANEL    metformin ER (GLUCOPHAGE XR) 500 MG TABLET SR 24 HR   2. Dyslipidemia associated with type 2 diabetes mellitus (CMS-[facility])  atorvastatin (LIPITOR) 20 MG Tab   3. Vitamin D deficiency  VITAMIN D,25 HYDROXY    vitamin D, Ergocalciferol, (DRISDOL) 96130 UNITS Cap capsule     She'll start Lipitor 20 mg daily and weekly vitamin D supplements. She'll continue metformin 500 mg twice a day, new Rx for extended release sent to pharmacy. She'll have labs done in follow-up for diabetes care visit. Unfortunately she is unable to attend diabetes education classes so we will plan to provide some additional education at her upcoming appointment. Earliest morning appointment is best due to her work schedule.    Followup: Return in about 2 " months (around 9/5/2017) for DCV.  As scheduled, sooner if symptoms don't resolve or with any new problems.         Reviewed side effects, risks, and benefits of medications prescribed today.  Advised to take all medications as instructed and report any side effects.   The patient voices understanding and agrees.  Report any new or worsening symptoms.  Have labs or other diagnostic studies prior to follow up.  Keep all appointments for any referrals given.      Please note this dictation was created using voice recognition software. Every reasonable attempt has been made to correct obvious errors, however there may be errors of grammar and possibly content that were not discovered before finalizing the note.

## 2017-07-11 ENCOUNTER — HOSPITAL ENCOUNTER (OUTPATIENT)
Dept: LAB | Facility: MEDICAL CENTER | Age: 42
End: 2017-07-11
Attending: INTERNAL MEDICINE
Payer: COMMERCIAL

## 2017-07-11 LAB
BDY FAT % MEASURED: 30.7 %
BP DIAS: 79 MMHG
BP SYS: 122 MMHG
CHOLEST SERPL-MCNC: 126 MG/DL (ref 100–199)
DIABETES HTDIA: NO
EVENT NAME HTEVT: NORMAL
FASTING HTFAS: YES
GLUCOSE SERPL-MCNC: 92 MG/DL (ref 65–99)
HDLC SERPL-MCNC: 39 MG/DL
HYPERTENSION HTHYP: NO
LDLC SERPL CALC-MCNC: 70 MG/DL
SCREENING LOC CITY HTCIT: NORMAL
SCREENING LOC STATE HTSTA: NORMAL
SCREENING LOCATION HTLOC: NORMAL
SMOKING HTSMO: NO
SUBSCRIBER ID HTSID: NORMAL
TRIGL SERPL-MCNC: 84 MG/DL (ref 0–149)

## 2017-07-11 PROCEDURE — 80061 LIPID PANEL: CPT

## 2017-07-11 PROCEDURE — S5190 WELLNESS ASSESSMENT BY NONPH: HCPCS

## 2017-07-11 PROCEDURE — 82947 ASSAY GLUCOSE BLOOD QUANT: CPT

## 2017-07-11 PROCEDURE — 36415 COLL VENOUS BLD VENIPUNCTURE: CPT

## 2017-08-03 RX ORDER — ERGOCALCIFEROL 1.25 MG/1
CAPSULE ORAL
Qty: 4 CAP | Refills: 0 | Status: SHIPPED | OUTPATIENT
Start: 2017-08-03 | End: 2021-08-20

## 2017-08-09 DIAGNOSIS — B37.31 YEAST VAGINITIS: ICD-10-CM

## 2017-08-09 DIAGNOSIS — B37.9 YEAST INFECTION: ICD-10-CM

## 2017-08-09 RX ORDER — ERGOCALCIFEROL 1.25 MG/1
CAPSULE ORAL
Refills: 0 | OUTPATIENT
Start: 2017-08-09

## 2017-08-09 RX ORDER — FLUCONAZOLE 100 MG/1
100 TABLET ORAL DAILY
Qty: 10 TAB | Refills: 0 | Status: CANCELLED | OUTPATIENT
Start: 2017-08-09

## 2017-08-09 NOTE — TELEPHONE ENCOUNTER
Pt called requesting a refill of her medication. Detrol? States pharmacy on file is [facility] in [place]

## 2017-08-10 ENCOUNTER — GYNECOLOGY VISIT (OUTPATIENT)
Dept: OBGYN | Facility: CLINIC | Age: 42
End: 2017-08-10
Payer: COMMERCIAL

## 2017-08-10 VITALS
HEIGHT: 64 IN | DIASTOLIC BLOOD PRESSURE: 72 MMHG | BODY MASS INDEX: 23.9 KG/M2 | SYSTOLIC BLOOD PRESSURE: 112 MMHG | WEIGHT: 140 LBS

## 2017-08-10 DIAGNOSIS — R39.89 POSSIBLE URINARY TRACT INFECTION: ICD-10-CM

## 2017-08-10 LAB
APPEARANCE UR: NORMAL
BILIRUB UR STRIP-MCNC: NORMAL MG/DL
COLOR UR AUTO: NORMAL
GLUCOSE UR STRIP.AUTO-MCNC: NORMAL MG/DL
KETONES UR STRIP.AUTO-MCNC: NORMAL MG/DL
LEUKOCYTE ESTERASE UR QL STRIP.AUTO: NORMAL
NITRITE UR QL STRIP.AUTO: NORMAL
PH UR STRIP.AUTO: 5.5 [PH] (ref 5–8)
PROT UR QL STRIP: NORMAL MG/DL
RBC UR QL AUTO: NORMAL
SP GR UR STRIP.AUTO: 1
UROBILINOGEN UR STRIP-MCNC: NORMAL MG/DL

## 2017-08-10 PROCEDURE — 99212 OFFICE O/P EST SF 10 MIN: CPT | Performed by: NURSE PRACTITIONER

## 2017-08-10 PROCEDURE — 81002 URINALYSIS NONAUTO W/O SCOPE: CPT | Performed by: NURSE PRACTITIONER

## 2017-08-10 RX ORDER — FLUCONAZOLE 100 MG/1
100 TABLET ORAL DAILY
Qty: 10 TAB | Refills: 0 | Status: SHIPPED | OUTPATIENT
Start: 2017-08-10 | End: 2017-08-20

## 2017-08-10 RX ORDER — METRONIDAZOLE 500 MG/1
500 TABLET ORAL 2 TIMES DAILY
Qty: 14 TAB | Refills: 0 | Status: SHIPPED | OUTPATIENT
Start: 2017-08-10 | End: 2017-10-01 | Stop reason: SDUPTHER

## 2017-08-10 NOTE — MR AVS SNAPSHOT
"Nancy Carcamo   8/10/2017 3:45 PM   Gynecology Visit   MRN: [id number]    Department:  [facility]   Dept Phone:  [phone]    Description:  Female : 1975   Provider:  Rebekah Carter D.N.P.           Reason for Visit     Gynecologic Exam           Allergies as of 8/10/2017     Allergen Noted Reactions    Nkda [No Known Drug Allergy] 2010         You were diagnosed with     Possible urinary tract infection   [6843487]         Vital Signs     Blood Pressure Height Weight Body Mass Index Last Menstrual Period Breastfeeding?    112/72 mmHg 1.626 m (5' 4\") 63.504 kg (140 lb) 24.02 kg/m2 2011 No    Smoking Status                   Never Smoker            Basic Information     Date Of Birth Sex Race Ethnicity Preferred Language    1975 Female  Unknown English      Your appointments     Aug 10, 2017  3:45 PM   GYN Visit with Rebekah Carter D.N.P.   [facility]'s [address], [address]  [facility] [id number] [phone]            Aug 22, 2017  8:00 AM   Diabetes Care Visit with EVAN Multani, [place] DIABETES RN   [facility])    [address] [place] [address], [address]  [facility] [address] [phone]           You will be receiving a confirmation call a few days before your appointment from our automated call confirmation system.              Problem List              ICD-10-CM Priority Class Noted - Resolved    Vitamin D deficiency E55.9   2015 - Present    Ovarian retention cyst N83.209   2016 - Present    Menorrhagia, premenopausal N92.4   2016 - Present    Pelvic and perineal pain R10.2   2016 - Present    OAB (overactive bladder) N32.81   10/18/2016 - Present    MDD (major depressive disorder), single episode, mild (CMS-HCC) F32.0   2017 - Present    Controlled type 2 diabetes mellitus without complication, without long-term current use of insulin " (CMS-HCC) E11.9   7/5/2017 - Present    Dyslipidemia associated with type 2 diabetes mellitus (CMS-HCC) E11.69, E78.5   7/5/2017 - Present      Health Maintenance        Date Due Completion Dates    IMM HEP B VACCINE (1 of 3 - Primary Series) 1975 ---    DIABETES MONOFILAMENT / LE EXAM 1975 ---    RETINAL SCREENING 3/27/1991 ---    URINE ACR / MICROALBUMIN 3/27/1991 ---    IMM DTaP/Tdap/Td Vaccine (1 - Tdap) 3/27/1992 ---    IMM PNEUMOCOCCAL 19-64 (ADULT) MEDIUM RISK SERIES (1 of 1 - PPSV23) 3/27/1992 ---    PAP SMEAR 7/7/2017 7/7/2014, 1/10/2012, 3/11/2011    IMM INFLUENZA (1) 9/1/2017 11/1/2016, 12/29/2015, 11/12/2014, 11/14/2012, 12/10/2011    MAMMOGRAM 11/22/2017 11/22/2016, 10/18/2016 (Done), 9/11/2015, 7/23/2014    Override on 10/18/2016: Done    A1C SCREENING 11/25/2017 5/25/2017, 4/7/2017, 2/28/2017, 12/22/2015, 8/21/2015, 8/13/2011    SERUM CREATININE 5/25/2018 5/25/2017, 4/7/2017, 2/28/2017, 9/11/2016, 8/3/2016, 8/21/2015, 1/18/2014, 3/14/2011, 10/30/2006    FASTING LIPID PROFILE 7/11/2018 7/11/2017, 5/25/2017, 2/28/2017, 8/21/2015, 7/22/2014, 1/18/2014, 7/25/2013, 8/11/2012, 3/14/2011            Results     POCT Urinalysis      Component Value Standard Range & Units    POC Color  Negative    POC Appearance  Negative    POC Leukocyte Esterase trace Negative    POC Nitrites neg Negative    POC Urobiligen  Negative (0.2) mg/dL    POC Protein neg Negative mg/dL    POC Urine PH 5.5 5.0 - 8.0    POC Blood trace Negative    POC Specific Gravity 1.005 <1.005 - >1.030    POC Ketones neg Negative mg/dL    POC Biliruben  Negative mg/dL    POC Glucose neg Negative mg/dL                        Current Immunizations     Influenza TIV (IM) 11/14/2012, 12/10/2011  5:00 AM    Influenza Vaccine Quad Inj (Pf) 11/1/2016, 11/12/2014    Influenza Vaccine Quad Inj (Preserved) 12/29/2015    Tuberculin Skin Test 4/4/2014      Below and/or attached are the medications your provider expects you to take. Review all of your  home medications and newly ordered medications with your provider and/or pharmacist. Follow medication instructions as directed by your provider and/or pharmacist. Please keep your medication list with you and share with your provider. Update the information when medications are discontinued, doses are changed, or new medications (including over-the-counter products) are added; and carry medication information at all times in the event of emergency situations     Allergies:  NKDA - (reactions not documented)               Medications  Valid as of: August 10, 2017 -  3:43 PM    Generic Name Brand Name Tablet Size Instructions for use    Atorvastatin Calcium (Tab) LIPITOR 20 MG Take 1 Tab by mouth every day.        Blood Glucose Monitoring Suppl (Misc) Blood Glucose Monitoring Suppl SUPPLIES Test strips order: Test strips for Abbott Freestyle Lite meter. Sig: use Daily and prn ssx high or low sugar        Blood Glucose Monitoring Suppl (Device) Blood Glucose Monitoring Suppl  Meter: Dispense Abbott Freestyle Lite meter. Sig. Use daily as directed for blood sugar monitoring.        Ergocalciferol (Cap) DRISDOL 78725 UNITS TAKE 1 CAP BY MOUTH EVERY 7 DAYS. LABS EVERY THREE MONTHS, DO NOT REFILL UNTIL AFTER LABS.        Fluconazole (Tab) DIFLUCAN 100 MG Take 1 Tab by mouth every day for 10 days.        Lancets (Misc) Lancets  Lancets order: Lancets for Abbott Freestyle Lite meter. Sig: use daily and prn ssx high or low sugar.        MetFORMIN HCl (TABLET SR 24 HR) GLUCOPHAGE  MG Take 1 Tab by mouth 2 times a day, with meals.        MetroNIDAZOLE (Tab) FLAGYL 500 MG Take 1 Tab by mouth 2 Times a Day.        Phentermine HCl (Tab) ADIPEX-P 37.5 MG Take 1 Tab by mouth every morning before breakfast.        Tolterodine Tartrate (CAPSULE SR 24 HR) DETROL LA 4 MG Take 1 Cap by mouth every day.        .                 Medicines prescribed today were sent to:     [facility]/PHARMACY #[id number] - [place], [place] - [address]       [address] [facility] [address] [facility] [address]    Phone: [phone] Fax: [phone]    Open 24 Hours?: No      Medication refill instructions:       If your prescription bottle indicates you have medication refills left, it is not necessary to call your provider’s office. Please contact your pharmacy and they will refill your medication.    If your prescription bottle indicates you do not have any refills left, you may request refills at any time through one of the following ways: The online [employer] system (except Urgent Care), by calling your provider’s office, or by asking your pharmacy to contact your provider’s office with a refill request. Medication refills are processed only during regular business hours and may not be available until the next business day. Your provider may request additional information or to have a follow-up visit with you prior to refilling your medication.   *Please Note: Medication refills are assigned a new Rx number when refilled electronically. Your pharmacy may indicate that no refills were authorized even though a new prescription for the same medication is available at the pharmacy. Please request the medicine by name with the pharmacy before contacting your provider for a refill.           [employer] Access Code: Activation code not generated  Current [employer] Status: Active

## 2017-08-10 NOTE — PROGRESS NOTES
Gyn problem fit in today.    S) patient c/o vaginal itching, burning and abnormal discharge x 1 week. Tried a diflucan and monistat externally with no relief. States funny feeling with urination but not necessarily dysuria.No new sexual encounters or exposure  O) UA clean catch neg         Wet mount positive clues,neg trich, likely positive hyphae but difficult to determine  A) BV and likely vulvovaginal candida  P) Flagyl to pharmacy with caution about no alcohol. May use monistat 7. Discussed use of scented products body washes, acidophils.

## 2017-08-11 ENCOUNTER — HOSPITAL ENCOUNTER (OUTPATIENT)
Facility: MEDICAL CENTER | Age: 42
End: 2017-08-11
Attending: NURSE PRACTITIONER
Payer: COMMERCIAL

## 2017-08-11 DIAGNOSIS — R39.89 POSSIBLE URINARY TRACT INFECTION: ICD-10-CM

## 2017-08-11 LAB
CANDIDA DNA VAG QL PROBE+SIG AMP: POSITIVE
G VAGINALIS DNA VAG QL PROBE+SIG AMP: NEGATIVE
T VAGINALIS DNA VAG QL PROBE+SIG AMP: NEGATIVE

## 2017-08-11 PROCEDURE — 87510 GARDNER VAG DNA DIR PROBE: CPT

## 2017-08-11 PROCEDURE — 87660 TRICHOMONAS VAGIN DIR PROBE: CPT

## 2017-08-11 PROCEDURE — 87480 CANDIDA DNA DIR PROBE: CPT

## 2017-08-22 ENCOUNTER — APPOINTMENT (OUTPATIENT)
Dept: MEDICAL GROUP | Facility: PHYSICIAN GROUP | Age: 42
End: 2017-08-22
Payer: COMMERCIAL

## 2017-10-01 DIAGNOSIS — E11.9 CONTROLLED TYPE 2 DIABETES MELLITUS WITHOUT COMPLICATION, WITHOUT LONG-TERM CURRENT USE OF INSULIN (HCC): ICD-10-CM

## 2017-10-01 DIAGNOSIS — E11.9 NON-INSULIN DEPENDENT TYPE 2 DIABETES MELLITUS (HCC): ICD-10-CM

## 2017-10-01 DIAGNOSIS — R39.89 POSSIBLE URINARY TRACT INFECTION: ICD-10-CM

## 2017-10-03 RX ORDER — METFORMIN HYDROCHLORIDE 500 MG/1
500 TABLET, EXTENDED RELEASE ORAL 2 TIMES DAILY WITH MEALS
Qty: 180 TAB | Refills: 1 | Status: SHIPPED | OUTPATIENT
Start: 2017-10-03 | End: 2018-04-21 | Stop reason: SDUPTHER

## 2017-10-03 NOTE — TELEPHONE ENCOUNTER
Patient has recently been seen by PCP within the last 6 months per protocol (8/17). Will refill medications for 6 months.  Lab Results   Component Value Date/Time    HBA1C 6.1 (H) 05/25/2017 07:04 AM      No results found for: MICROALBCALC, MALBCRT, MALBEXCR, HPZLFP01, MICROALBUR, MICRALB, UMICROALBUM, MICROALBTIM   Lab Results   Component Value Date/Time    ALKPHOSPHAT 60 05/25/2017 07:04 AM    ASTSGOT 21 05/25/2017 07:04 AM    ALTSGPT 23 05/25/2017 07:04 AM    TBILIRUBIN 0.4 05/25/2017 07:04 AM

## 2017-10-05 ENCOUNTER — TELEPHONE (OUTPATIENT)
Dept: OBGYN | Facility: MEDICAL CENTER | Age: 42
End: 2017-10-05

## 2017-10-05 NOTE — TELEPHONE ENCOUNTER
Call from pt states that she normally see Dr Cox and is requesting an urgent apt for today. State she has an infection. Informed pt that I do not have any fit in apt today or tomorrow. Referred pt to PCP or Urgent care. Pt states that she verbally understands.

## 2017-10-09 RX ORDER — METRONIDAZOLE 500 MG/1
500 TABLET ORAL 2 TIMES DAILY
Qty: 14 TAB | Refills: 0 | Status: SHIPPED | OUTPATIENT
Start: 2017-10-09 | End: 2021-08-19

## 2018-01-09 DIAGNOSIS — E11.69 DYSLIPIDEMIA ASSOCIATED WITH TYPE 2 DIABETES MELLITUS (HCC): ICD-10-CM

## 2018-01-09 DIAGNOSIS — E78.5 DYSLIPIDEMIA ASSOCIATED WITH TYPE 2 DIABETES MELLITUS (HCC): ICD-10-CM

## 2018-01-09 RX ORDER — ATORVASTATIN CALCIUM 20 MG/1
20 TABLET, FILM COATED ORAL DAILY
Qty: 90 TAB | Refills: 1 | Status: SHIPPED | OUTPATIENT
Start: 2018-01-09 | End: 2018-07-21 | Stop reason: SDUPTHER

## 2018-01-09 NOTE — TELEPHONE ENCOUNTER
Pt has had OV within the 12 month protocol and lipid panel is current. 6 month supply sent to pharmacy.   Lab Results   Component Value Date/Time    CHOLSTRLTOT 126 07/11/2017 10:37 AM    LDL 70 07/11/2017 10:37 AM    HDL 39 (A) 07/11/2017 10:37 AM    TRIGLYCERIDE 84 07/11/2017 10:37 AM       Lab Results   Component Value Date/Time    SODIUM 139 05/25/2017 07:04 AM    POTASSIUM 3.5 (L) 05/25/2017 07:04 AM    CHLORIDE 104 05/25/2017 07:04 AM    CO2 26 05/25/2017 07:04 AM    GLUCOSE 92 07/11/2017 10:37 AM    BUN 10 05/25/2017 07:04 AM    CREATININE 0.72 05/25/2017 07:04 AM    CREATININE 0.80 03/14/2011 08:25 AM    BUNCREATRAT 10 03/14/2011 08:25 AM    GLOMRATE >59 03/14/2011 08:25 AM     Lab Results   Component Value Date/Time    ALKPHOSPHAT 60 05/25/2017 07:04 AM    ASTSGOT 21 05/25/2017 07:04 AM    ALTSGPT 23 05/25/2017 07:04 AM    TBILIRUBIN 0.4 05/25/2017 07:04 AM

## 2018-01-09 NOTE — TELEPHONE ENCOUNTER
Was the patient seen in the last year in this department? Yes     Does patient have an active prescription for medications requested? No     Received Request Via: Pharmacy      Pt met protocol?: Yes      Lab Results  Component Value Date/Time   CHOLSTRLTOT 126 07/11/2017 1037   TRIGLYCERIDE 84 07/11/2017 1037   HDL 39 (A) 07/11/2017 1037   LDL 70 07/11/2017 1037       pid    Ov 7/17

## 2018-03-06 NOTE — TELEPHONE ENCOUNTER
Was the patient seen in the last year in this department? Yes  07/11/2017    Does patient have an active prescription for medications requested? Yes     Received Request Via: Pharmacy

## 2018-03-07 ENCOUNTER — GYNECOLOGY VISIT (OUTPATIENT)
Dept: OBGYN | Facility: CLINIC | Age: 43
End: 2018-03-07
Payer: COMMERCIAL

## 2018-03-07 ENCOUNTER — HOSPITAL ENCOUNTER (OUTPATIENT)
Facility: MEDICAL CENTER | Age: 43
End: 2018-03-07
Attending: OBSTETRICS & GYNECOLOGY
Payer: COMMERCIAL

## 2018-03-07 VITALS
WEIGHT: 143 LBS | DIASTOLIC BLOOD PRESSURE: 66 MMHG | HEIGHT: 64 IN | BODY MASS INDEX: 24.41 KG/M2 | SYSTOLIC BLOOD PRESSURE: 114 MMHG

## 2018-03-07 DIAGNOSIS — Z11.3 SCREENING FOR VENEREAL DISEASE: ICD-10-CM

## 2018-03-07 DIAGNOSIS — L29.2 VULVAR ITCHING: ICD-10-CM

## 2018-03-07 DIAGNOSIS — Z12.31 VISIT FOR SCREENING MAMMOGRAM: ICD-10-CM

## 2018-03-07 DIAGNOSIS — Z01.419 WELL WOMAN EXAM: ICD-10-CM

## 2018-03-07 LAB
CANDIDA DNA VAG QL PROBE+SIG AMP: NEGATIVE
G VAGINALIS DNA VAG QL PROBE+SIG AMP: NEGATIVE
T VAGINALIS DNA VAG QL PROBE+SIG AMP: NEGATIVE

## 2018-03-07 PROCEDURE — 87660 TRICHOMONAS VAGIN DIR PROBE: CPT

## 2018-03-07 PROCEDURE — 87591 N.GONORRHOEAE DNA AMP PROB: CPT

## 2018-03-07 PROCEDURE — 87480 CANDIDA DNA DIR PROBE: CPT

## 2018-03-07 PROCEDURE — 87491 CHLMYD TRACH DNA AMP PROBE: CPT

## 2018-03-07 PROCEDURE — 87510 GARDNER VAG DNA DIR PROBE: CPT

## 2018-03-07 PROCEDURE — 99396 PREV VISIT EST AGE 40-64: CPT | Performed by: OBSTETRICS & GYNECOLOGY

## 2018-03-07 RX ORDER — FLUCONAZOLE 150 MG/1
TABLET ORAL
Qty: 1 TAB | Refills: 2 | Status: SHIPPED | OUTPATIENT
Start: 2018-03-07 | End: 2021-08-19

## 2018-03-07 NOTE — PROGRESS NOTES
" Nancy Carcamo42 y.o.  female presents for Annual Well Woman Exam.   Patient is currently without complaints. Patient is  Not having menses with last menstrual period 2 years ago.  Patient is status post TlH with LSO. Patient is without complaints today except for some vulvar itching. Patient states that she has taken Monistat and did take some metronidazole some weeks ago without relief. She states the vulvar itching has been worsening over the last week. No abnormal vaginal discharge, no visible rash or lesions    ROS: Patient is feeling well. No dyspnea or chest pain on exertion. No Abdominal pain, change in bowel habits, black or bloody stools. No urinary sx. GYN ROS:normal menses, no abnormal bleeding, pelvic pain or discharge, no breast pain or new or enlarging lumps on self exam. Denies breast tenderness, mass, discharge, changes in size or contour, or abnormal cyclic discomfort. No neurological complaints.  Past Medical History:   Diagnosis Date   • Cyst, ovarian     and fibroids   • Diabetes     With last pregnancy    • OAB (overactive bladder) 10/18/2016   • Snoring      TLH LSO  Allergy:   Nkda [no known drug allergy]    LMP:       Patient's last menstrual period was 2014. .     Menstrual History: s/p hysterectomy      Pap history, the patient denies abnormal Pap smears and denies   sexually transmitted diseases    Mammo:needs    Objective : The patient appears well, alert and oriented x 3, in no acute distress.  Blood pressure 114/66, height 1.626 m (5' 4\"), weight 64.9 kg (143 lb), last menstrual period 2014, not currently breastfeeding.  HEENT Exam: EOMI, GIOVANNA, no adenopathy or thyromegaly.  Lungs: Clear to Auscultation   Cor: S1 and S2 normal, no murmurs, or rubs   Abdomen: Soft without tenderness, guarding mass or organomegaly.  Extremities: No edema, pulses equal  Neurological: Normal No focal signs  Breast Exam:Inspection negative. No nipple discharge or bleeding. No " masses or nodularity palpable  Pelvic: negative findings: external genitalia normal, Bartholin's glands, urethra, [place]ub[place]'s glands negative, vaginal mucosa normal, adnexae negative, uterus and cervix are absent  External vulva appears normal, no abnormal vaginal discharge appreciated    Lab:No results found for this or any previous visit (from the past 336 hour(s)).    Assessment:  well woman    Plan:mammogram  counseled on breast self exam, mammography screening and STD prevention  return annually or prn  Gonorrhea Chlamydia cultures are performed as well as vaginal pathogens culture  Prescription sent to pharmacy for Diflucan to be picked up if vaginal cultures are abnormal  Contraception:none

## 2018-03-08 LAB
C TRACH DNA SPEC QL NAA+PROBE: NEGATIVE
N GONORRHOEA DNA SPEC QL NAA+PROBE: NEGATIVE
SPECIMEN SOURCE: NORMAL

## 2018-03-12 RX ORDER — TOLTERODINE 4 MG/1
CAPSULE, EXTENDED RELEASE ORAL
Qty: 30 CAP | Refills: 0 | Status: SHIPPED | OUTPATIENT
Start: 2018-03-12 | End: 2021-08-20

## 2018-04-21 DIAGNOSIS — E11.9 CONTROLLED TYPE 2 DIABETES MELLITUS WITHOUT COMPLICATION, WITHOUT LONG-TERM CURRENT USE OF INSULIN (HCC): ICD-10-CM

## 2018-04-23 RX ORDER — METFORMIN HYDROCHLORIDE 500 MG/1
500 TABLET, EXTENDED RELEASE ORAL 2 TIMES DAILY WITH MEALS
Qty: 180 TAB | Refills: 0 | Status: SHIPPED | OUTPATIENT
Start: 2018-04-23 | End: 2021-08-20

## 2018-07-21 ENCOUNTER — TELEPHONE (OUTPATIENT)
Dept: MEDICAL GROUP | Facility: PHYSICIAN GROUP | Age: 43
End: 2018-07-21

## 2018-07-21 DIAGNOSIS — E11.69 DYSLIPIDEMIA ASSOCIATED WITH TYPE 2 DIABETES MELLITUS (HCC): ICD-10-CM

## 2018-07-21 DIAGNOSIS — E78.5 DYSLIPIDEMIA ASSOCIATED WITH TYPE 2 DIABETES MELLITUS (HCC): ICD-10-CM

## 2018-07-23 RX ORDER — ATORVASTATIN CALCIUM 20 MG/1
20 TABLET, FILM COATED ORAL DAILY
Qty: 90 TAB | Refills: 0 | Status: SHIPPED | OUTPATIENT
Start: 2018-07-23 | End: 2021-08-20

## 2018-07-23 NOTE — TELEPHONE ENCOUNTER
Was the patient seen in the last year in this department? No     Does patient have an active prescription for medications requested? No     Received Request Via: Pharmacy    Pt met protocol?: No     Last OV 07/2017    Lab Results  Component Value Date/Time   CHOLSTRLTOT 126 07/11/2017 1037   TRIGLYCERIDE 84 07/11/2017 1037   HDL 39 (A) 07/11/2017 1037   LDL 70 07/11/2017 1037

## 2021-08-19 PROCEDURE — 93005 ELECTROCARDIOGRAM TRACING: CPT | Performed by: EMERGENCY MEDICINE

## 2021-08-19 PROCEDURE — 99285 EMERGENCY DEPT VISIT HI MDM: CPT

## 2021-08-19 PROCEDURE — 700111 HCHG RX REV CODE 636 W/ 250 OVERRIDE (IP): Performed by: EMERGENCY MEDICINE

## 2021-08-19 PROCEDURE — 93005 ELECTROCARDIOGRAM TRACING: CPT

## 2021-08-19 PROCEDURE — 700111 HCHG RX REV CODE 636 W/ 250 OVERRIDE (IP)

## 2021-08-19 RX ORDER — ONDANSETRON 4 MG/1
4 TABLET, ORALLY DISINTEGRATING ORAL ONCE
Status: COMPLETED | OUTPATIENT
Start: 2021-08-19 | End: 2021-08-19

## 2021-08-19 RX ADMIN — ONDANSETRON 4 MG: 4 TABLET, ORALLY DISINTEGRATING ORAL at 23:14

## 2021-08-20 ENCOUNTER — APPOINTMENT (OUTPATIENT)
Dept: RADIOLOGY | Facility: MEDICAL CENTER | Age: 46
DRG: 177 | End: 2021-08-20
Attending: EMERGENCY MEDICINE
Payer: OTHER GOVERNMENT

## 2021-08-20 ENCOUNTER — HOSPITAL ENCOUNTER (INPATIENT)
Facility: MEDICAL CENTER | Age: 46
LOS: 1 days | DRG: 177 | End: 2021-08-21
Attending: EMERGENCY MEDICINE | Admitting: STUDENT IN AN ORGANIZED HEALTH CARE EDUCATION/TRAINING PROGRAM
Payer: OTHER GOVERNMENT

## 2021-08-20 DIAGNOSIS — R11.2 NON-INTRACTABLE VOMITING WITH NAUSEA, UNSPECIFIED VOMITING TYPE: ICD-10-CM

## 2021-08-20 DIAGNOSIS — J96.01 ACUTE RESPIRATORY FAILURE WITH HYPOXIA (HCC): ICD-10-CM

## 2021-08-20 DIAGNOSIS — U07.1 COVID-19 VIRUS INFECTION: ICD-10-CM

## 2021-08-20 PROBLEM — J06.9 ACUTE RESPIRATORY DISEASE DUE TO COVID-19 VIRUS: Status: ACTIVE | Noted: 2021-08-20

## 2021-08-20 PROBLEM — R00.0 TACHYCARDIA: Status: ACTIVE | Noted: 2021-08-20

## 2021-08-20 LAB
ALBUMIN SERPL BCP-MCNC: 4.1 G/DL (ref 3.2–4.9)
ALBUMIN/GLOB SERPL: 1 G/DL
ALP SERPL-CCNC: 60 U/L (ref 30–99)
ALT SERPL-CCNC: 37 U/L (ref 2–50)
ANION GAP SERPL CALC-SCNC: 17 MMOL/L (ref 7–16)
AST SERPL-CCNC: 44 U/L (ref 12–45)
BASOPHILS # BLD AUTO: 0.1 % (ref 0–1.8)
BASOPHILS # BLD: 0.01 K/UL (ref 0–0.12)
BILIRUB SERPL-MCNC: 0.4 MG/DL (ref 0.1–1.5)
BUN SERPL-MCNC: 9 MG/DL (ref 8–22)
CALCIUM SERPL-MCNC: 9.1 MG/DL (ref 8.5–10.5)
CHLORIDE SERPL-SCNC: 97 MMOL/L (ref 96–112)
CO2 SERPL-SCNC: 21 MMOL/L (ref 20–33)
CREAT SERPL-MCNC: 0.71 MG/DL (ref 0.5–1.4)
CRP SERPL HS-MCNC: 9.55 MG/DL (ref 0–0.75)
D DIMER PPP IA.FEU-MCNC: 0.37 UG/ML (FEU) (ref 0–0.5)
EKG IMPRESSION: NORMAL
EOSINOPHIL # BLD AUTO: 0 K/UL (ref 0–0.51)
EOSINOPHIL NFR BLD: 0 % (ref 0–6.9)
ERYTHROCYTE [DISTWIDTH] IN BLOOD BY AUTOMATED COUNT: 45.5 FL (ref 35.9–50)
EST. AVERAGE GLUCOSE BLD GHB EST-MCNC: 157 MG/DL
GLOBULIN SER CALC-MCNC: 4.2 G/DL (ref 1.9–3.5)
GLUCOSE SERPL-MCNC: 115 MG/DL (ref 65–99)
HBA1C MFR BLD: 7.1 % (ref 4–5.6)
HCT VFR BLD AUTO: 43.4 % (ref 37–47)
HGB BLD-MCNC: 13.9 G/DL (ref 12–16)
IMM GRANULOCYTES # BLD AUTO: 0.05 K/UL (ref 0–0.11)
IMM GRANULOCYTES NFR BLD AUTO: 0.5 % (ref 0–0.9)
LACTATE BLD-SCNC: 1.2 MMOL/L (ref 0.5–2)
LACTATE BLD-SCNC: 2.3 MMOL/L (ref 0.5–2)
LYMPHOCYTES # BLD AUTO: 1.81 K/UL (ref 1–4.8)
LYMPHOCYTES NFR BLD: 18.2 % (ref 22–41)
MAGNESIUM SERPL-MCNC: 2.1 MG/DL (ref 1.5–2.5)
MCH RBC QN AUTO: 27.9 PG (ref 27–33)
MCHC RBC AUTO-ENTMCNC: 32 G/DL (ref 33.6–35)
MCV RBC AUTO: 87.1 FL (ref 81.4–97.8)
MONOCYTES # BLD AUTO: 0.3 K/UL (ref 0–0.85)
MONOCYTES NFR BLD AUTO: 3 % (ref 0–13.4)
NEUTROPHILS # BLD AUTO: 7.76 K/UL (ref 2–7.15)
NEUTROPHILS NFR BLD: 78.2 % (ref 44–72)
NRBC # BLD AUTO: 0 K/UL
NRBC BLD-RTO: 0 /100 WBC
PLATELET # BLD AUTO: 262 K/UL (ref 164–446)
PMV BLD AUTO: 10.6 FL (ref 9–12.9)
POTASSIUM SERPL-SCNC: 3.4 MMOL/L (ref 3.6–5.5)
PROCALCITONIN SERPL-MCNC: 0.26 NG/ML
PROT SERPL-MCNC: 8.3 G/DL (ref 6–8.2)
RBC # BLD AUTO: 4.98 M/UL (ref 4.2–5.4)
SODIUM SERPL-SCNC: 135 MMOL/L (ref 135–145)
TROPONIN T SERPL-MCNC: <6 NG/L (ref 6–19)
WBC # BLD AUTO: 9.9 K/UL (ref 4.8–10.8)

## 2021-08-20 PROCEDURE — 700105 HCHG RX REV CODE 258: Performed by: INTERNAL MEDICINE

## 2021-08-20 PROCEDURE — 83735 ASSAY OF MAGNESIUM: CPT

## 2021-08-20 PROCEDURE — 71045 X-RAY EXAM CHEST 1 VIEW: CPT

## 2021-08-20 PROCEDURE — 96374 THER/PROPH/DIAG INJ IV PUSH: CPT

## 2021-08-20 PROCEDURE — 700102 HCHG RX REV CODE 250 W/ 637 OVERRIDE(OP): Performed by: EMERGENCY MEDICINE

## 2021-08-20 PROCEDURE — 85025 COMPLETE CBC W/AUTO DIFF WBC: CPT

## 2021-08-20 PROCEDURE — 84484 ASSAY OF TROPONIN QUANT: CPT

## 2021-08-20 PROCEDURE — 85379 FIBRIN DEGRADATION QUANT: CPT

## 2021-08-20 PROCEDURE — 96372 THER/PROPH/DIAG INJ SC/IM: CPT

## 2021-08-20 PROCEDURE — 83605 ASSAY OF LACTIC ACID: CPT | Mod: 91

## 2021-08-20 PROCEDURE — 84145 PROCALCITONIN (PCT): CPT

## 2021-08-20 PROCEDURE — 770020 HCHG ROOM/CARE - TELE (206)

## 2021-08-20 PROCEDURE — 700105 HCHG RX REV CODE 258: Performed by: EMERGENCY MEDICINE

## 2021-08-20 PROCEDURE — A9270 NON-COVERED ITEM OR SERVICE: HCPCS | Performed by: INTERNAL MEDICINE

## 2021-08-20 PROCEDURE — A9270 NON-COVERED ITEM OR SERVICE: HCPCS | Performed by: EMERGENCY MEDICINE

## 2021-08-20 PROCEDURE — 99223 1ST HOSP IP/OBS HIGH 75: CPT | Performed by: STUDENT IN AN ORGANIZED HEALTH CARE EDUCATION/TRAINING PROGRAM

## 2021-08-20 PROCEDURE — 80053 COMPREHEN METABOLIC PANEL: CPT

## 2021-08-20 PROCEDURE — 700111 HCHG RX REV CODE 636 W/ 250 OVERRIDE (IP): Performed by: STUDENT IN AN ORGANIZED HEALTH CARE EDUCATION/TRAINING PROGRAM

## 2021-08-20 PROCEDURE — 86140 C-REACTIVE PROTEIN: CPT

## 2021-08-20 PROCEDURE — 700111 HCHG RX REV CODE 636 W/ 250 OVERRIDE (IP): Performed by: EMERGENCY MEDICINE

## 2021-08-20 PROCEDURE — A9270 NON-COVERED ITEM OR SERVICE: HCPCS | Performed by: STUDENT IN AN ORGANIZED HEALTH CARE EDUCATION/TRAINING PROGRAM

## 2021-08-20 PROCEDURE — 700102 HCHG RX REV CODE 250 W/ 637 OVERRIDE(OP): Performed by: INTERNAL MEDICINE

## 2021-08-20 PROCEDURE — 700111 HCHG RX REV CODE 636 W/ 250 OVERRIDE (IP): Performed by: INTERNAL MEDICINE

## 2021-08-20 PROCEDURE — 700102 HCHG RX REV CODE 250 W/ 637 OVERRIDE(OP): Performed by: STUDENT IN AN ORGANIZED HEALTH CARE EDUCATION/TRAINING PROGRAM

## 2021-08-20 PROCEDURE — 83036 HEMOGLOBIN GLYCOSYLATED A1C: CPT

## 2021-08-20 RX ORDER — PROCHLORPERAZINE EDISYLATE 5 MG/ML
5-10 INJECTION INTRAMUSCULAR; INTRAVENOUS EVERY 4 HOURS PRN
Status: DISCONTINUED | OUTPATIENT
Start: 2021-08-20 | End: 2021-08-21 | Stop reason: HOSPADM

## 2021-08-20 RX ORDER — CLONIDINE HYDROCHLORIDE 0.1 MG/1
0.1 TABLET ORAL EVERY 6 HOURS PRN
Status: DISCONTINUED | OUTPATIENT
Start: 2021-08-20 | End: 2021-08-21 | Stop reason: HOSPADM

## 2021-08-20 RX ORDER — ATORVASTATIN CALCIUM 20 MG/1
20 TABLET, FILM COATED ORAL DAILY
Status: DISCONTINUED | OUTPATIENT
Start: 2021-08-20 | End: 2021-08-21 | Stop reason: HOSPADM

## 2021-08-20 RX ORDER — GUAIFENESIN 600 MG/1
600 TABLET, EXTENDED RELEASE ORAL EVERY 12 HOURS
Status: DISCONTINUED | OUTPATIENT
Start: 2021-08-20 | End: 2021-08-21 | Stop reason: HOSPADM

## 2021-08-20 RX ORDER — PROMETHAZINE HYDROCHLORIDE 25 MG/1
12.5-25 SUPPOSITORY RECTAL EVERY 4 HOURS PRN
Status: DISCONTINUED | OUTPATIENT
Start: 2021-08-20 | End: 2021-08-21 | Stop reason: HOSPADM

## 2021-08-20 RX ORDER — PROMETHAZINE HYDROCHLORIDE 25 MG/1
12.5-25 TABLET ORAL EVERY 4 HOURS PRN
Status: DISCONTINUED | OUTPATIENT
Start: 2021-08-20 | End: 2021-08-21 | Stop reason: HOSPADM

## 2021-08-20 RX ORDER — POLYETHYLENE GLYCOL 3350 17 G/17G
1 POWDER, FOR SOLUTION ORAL
Status: DISCONTINUED | OUTPATIENT
Start: 2021-08-20 | End: 2021-08-21 | Stop reason: HOSPADM

## 2021-08-20 RX ORDER — ONDANSETRON 2 MG/ML
4 INJECTION INTRAMUSCULAR; INTRAVENOUS EVERY 4 HOURS PRN
Status: DISCONTINUED | OUTPATIENT
Start: 2021-08-20 | End: 2021-08-21 | Stop reason: HOSPADM

## 2021-08-20 RX ORDER — DEXTROMETHORPHAN HBR. AND GUAIFENESIN 10; 100 MG/5ML; MG/5ML
10 SOLUTION ORAL EVERY 6 HOURS PRN
Status: DISCONTINUED | OUTPATIENT
Start: 2021-08-20 | End: 2021-08-21 | Stop reason: HOSPADM

## 2021-08-20 RX ORDER — ONDANSETRON 4 MG/1
4 TABLET, ORALLY DISINTEGRATING ORAL EVERY 6 HOURS PRN
Status: DISCONTINUED | OUTPATIENT
Start: 2021-08-20 | End: 2021-08-20

## 2021-08-20 RX ORDER — DEXAMETHASONE 4 MG/1
6 TABLET ORAL DAILY
Status: DISCONTINUED | OUTPATIENT
Start: 2021-08-20 | End: 2021-08-20

## 2021-08-20 RX ORDER — POTASSIUM CHLORIDE 20 MEQ/1
40 TABLET, EXTENDED RELEASE ORAL ONCE
Status: COMPLETED | OUTPATIENT
Start: 2021-08-20 | End: 2021-08-20

## 2021-08-20 RX ORDER — ACETAMINOPHEN 325 MG/1
650 TABLET ORAL EVERY 6 HOURS PRN
Status: DISCONTINUED | OUTPATIENT
Start: 2021-08-20 | End: 2021-08-21 | Stop reason: HOSPADM

## 2021-08-20 RX ORDER — ENALAPRILAT 1.25 MG/ML
1.25 INJECTION INTRAVENOUS EVERY 6 HOURS PRN
Status: DISCONTINUED | OUTPATIENT
Start: 2021-08-20 | End: 2021-08-21 | Stop reason: HOSPADM

## 2021-08-20 RX ORDER — MAGNESIUM SULFATE HEPTAHYDRATE 40 MG/ML
INJECTION, SOLUTION INTRAVENOUS
Status: DISCONTINUED
Start: 2021-08-20 | End: 2021-08-20

## 2021-08-20 RX ORDER — IPRATROPIUM BROMIDE AND ALBUTEROL SULFATE 2.5; .5 MG/3ML; MG/3ML
3 SOLUTION RESPIRATORY (INHALATION)
Status: DISCONTINUED | OUTPATIENT
Start: 2021-08-20 | End: 2021-08-21 | Stop reason: HOSPADM

## 2021-08-20 RX ORDER — LABETALOL HYDROCHLORIDE 5 MG/ML
10 INJECTION, SOLUTION INTRAVENOUS EVERY 4 HOURS PRN
Status: DISCONTINUED | OUTPATIENT
Start: 2021-08-20 | End: 2021-08-21 | Stop reason: HOSPADM

## 2021-08-20 RX ORDER — AMOXICILLIN 250 MG
2 CAPSULE ORAL 2 TIMES DAILY
Status: DISCONTINUED | OUTPATIENT
Start: 2021-08-20 | End: 2021-08-21 | Stop reason: HOSPADM

## 2021-08-20 RX ORDER — ACETAMINOPHEN 500 MG
1000 TABLET ORAL EVERY 4 HOURS PRN
COMMUNITY

## 2021-08-20 RX ORDER — ONDANSETRON 4 MG/1
4 TABLET, ORALLY DISINTEGRATING ORAL EVERY 4 HOURS PRN
Status: DISCONTINUED | OUTPATIENT
Start: 2021-08-20 | End: 2021-08-21 | Stop reason: HOSPADM

## 2021-08-20 RX ORDER — ONDANSETRON 2 MG/ML
4 INJECTION INTRAMUSCULAR; INTRAVENOUS EVERY 6 HOURS PRN
Status: DISCONTINUED | OUTPATIENT
Start: 2021-08-20 | End: 2021-08-20

## 2021-08-20 RX ORDER — AZITHROMYCIN 250 MG/1
500 TABLET, FILM COATED ORAL DAILY
Status: DISCONTINUED | OUTPATIENT
Start: 2021-08-20 | End: 2021-08-21 | Stop reason: HOSPADM

## 2021-08-20 RX ORDER — BISACODYL 10 MG
10 SUPPOSITORY, RECTAL RECTAL
Status: DISCONTINUED | OUTPATIENT
Start: 2021-08-20 | End: 2021-08-21 | Stop reason: HOSPADM

## 2021-08-20 RX ORDER — DEXAMETHASONE 4 MG/1
6 TABLET ORAL DAILY
Status: DISCONTINUED | OUTPATIENT
Start: 2021-08-21 | End: 2021-08-21 | Stop reason: HOSPADM

## 2021-08-20 RX ORDER — KETOROLAC TROMETHAMINE 30 MG/ML
15 INJECTION, SOLUTION INTRAMUSCULAR; INTRAVENOUS ONCE
Status: COMPLETED | OUTPATIENT
Start: 2021-08-20 | End: 2021-08-20

## 2021-08-20 RX ORDER — SODIUM CHLORIDE 9 MG/ML
1000 INJECTION, SOLUTION INTRAVENOUS ONCE
Status: COMPLETED | OUTPATIENT
Start: 2021-08-20 | End: 2021-08-20

## 2021-08-20 RX ADMIN — ENOXAPARIN SODIUM 40 MG: 40 INJECTION SUBCUTANEOUS at 06:00

## 2021-08-20 RX ADMIN — KETOROLAC TROMETHAMINE 15 MG: 30 INJECTION, SOLUTION INTRAMUSCULAR; INTRAVENOUS at 02:04

## 2021-08-20 RX ADMIN — SODIUM CHLORIDE 1000 ML: 9 INJECTION, SOLUTION INTRAVENOUS at 01:58

## 2021-08-20 RX ADMIN — ATORVASTATIN CALCIUM 20 MG: 20 TABLET, FILM COATED ORAL at 05:08

## 2021-08-20 RX ADMIN — DEXAMETHASONE 6 MG: 4 TABLET ORAL at 05:05

## 2021-08-20 RX ADMIN — ACETAMINOPHEN 650 MG: 325 TABLET, FILM COATED ORAL at 20:57

## 2021-08-20 RX ADMIN — SODIUM CHLORIDE 3 G: 900 INJECTION INTRAVENOUS at 12:29

## 2021-08-20 RX ADMIN — AZITHROMYCIN MONOHYDRATE 500 MG: 250 TABLET ORAL at 10:31

## 2021-08-20 RX ADMIN — POTASSIUM CHLORIDE 40 MEQ: 1500 TABLET, EXTENDED RELEASE ORAL at 05:04

## 2021-08-20 RX ADMIN — SODIUM CHLORIDE 3 G: 900 INJECTION INTRAVENOUS at 17:39

## 2021-08-20 RX ADMIN — GUAIFENESIN 600 MG: 600 TABLET, EXTENDED RELEASE ORAL at 17:38

## 2021-08-20 RX ADMIN — GUAIFENESIN 600 MG: 600 TABLET, EXTENDED RELEASE ORAL at 09:25

## 2021-08-20 RX ADMIN — SODIUM CHLORIDE 3 G: 900 INJECTION INTRAVENOUS at 23:36

## 2021-08-20 ASSESSMENT — ENCOUNTER SYMPTOMS
MYALGIAS: 1
NAUSEA: 1
VOMITING: 1
DIARRHEA: 1
SHORTNESS OF BREATH: 1

## 2021-08-20 ASSESSMENT — COGNITIVE AND FUNCTIONAL STATUS - GENERAL
SUGGESTED CMS G CODE MODIFIER MOBILITY: CH
SUGGESTED CMS G CODE MODIFIER DAILY ACTIVITY: CH
MOBILITY SCORE: 24
DAILY ACTIVITIY SCORE: 24

## 2021-08-20 ASSESSMENT — LIFESTYLE VARIABLES
TOTAL SCORE: 0
ALCOHOL_USE: NO
EVER HAD A DRINK FIRST THING IN THE MORNING TO STEADY YOUR NERVES TO GET RID OF A HANGOVER: NO
TOTAL SCORE: 0
EVER FELT BAD OR GUILTY ABOUT YOUR DRINKING: NO
HAVE YOU EVER FELT YOU SHOULD CUT DOWN ON YOUR DRINKING: NO
DOES PATIENT WANT TO STOP DRINKING: NO
HOW MANY TIMES IN THE PAST YEAR HAVE YOU HAD 5 OR MORE DRINKS IN A DAY: 0
HAVE PEOPLE ANNOYED YOU BY CRITICIZING YOUR DRINKING: NO
TOTAL SCORE: 0
ON A TYPICAL DAY WHEN YOU DRINK ALCOHOL HOW MANY DRINKS DO YOU HAVE: 0
CONSUMPTION TOTAL: NEGATIVE
AVERAGE NUMBER OF DAYS PER WEEK YOU HAVE A DRINK CONTAINING ALCOHOL: 0

## 2021-08-20 ASSESSMENT — PATIENT HEALTH QUESTIONNAIRE - PHQ9
SUM OF ALL RESPONSES TO PHQ9 QUESTIONS 1 AND 2: 0
1. LITTLE INTEREST OR PLEASURE IN DOING THINGS: NOT AT ALL
2. FEELING DOWN, DEPRESSED, IRRITABLE, OR HOPELESS: NOT AT ALL

## 2021-08-20 ASSESSMENT — FIBROSIS 4 INDEX: FIB4 SCORE: 1.33

## 2021-08-20 ASSESSMENT — COPD QUESTIONNAIRES
COPD SCREENING SCORE: 1
DO YOU EVER COUGH UP ANY MUCUS OR PHLEGM?: NO/ONLY WITH OCCASIONAL COLDS OR INFECTIONS
DURING THE PAST 4 WEEKS HOW MUCH DID YOU FEEL SHORT OF BREATH: SOME OF THE TIME
HAVE YOU SMOKED AT LEAST 100 CIGARETTES IN YOUR ENTIRE LIFE: NO/DON'T KNOW

## 2021-08-20 NOTE — ED NOTES
Attending physician today from 0700 to 1200 is Dr Lovelace. Please contact this physician for questions, orders, updates.

## 2021-08-20 NOTE — ASSESSMENT & PLAN NOTE
- Conservative use of IVF  - Albuterol, O2 therapy, and proning as needed  - robitussin/guaifenacin for Sx management  - Decadron 6mg qd  - Holding abx, f/u w/ Procal  - f/u Blood Cultures, Sputum Cx  - Check D-dimer, CRP, Lactic Acid  - Lovenox for VTE ppx  - Consider ID consult for Remdesivir//Tocolizumab

## 2021-08-20 NOTE — CARE PLAN
Problem: Knowledge Deficit - Standard  Goal: Patient and family/care givers will demonstrate understanding of plan of care, disease process/condition, diagnostic tests and medications  Outcome: Progressing   The patient is Watcher - Medium risk of patient condition declining or worsening         Progress made toward(s) clinical / shift goals:  Pt is actively participating in care.    Patient is not progressing towards the following goals:

## 2021-08-20 NOTE — FACE TO FACE
"Face to Face Note  -  Durable Medical Equipment    Meka Del Real M.D. - NPI: [id number]  I certify that this patient is under my care and that they had a durable medical equipment(DME)face to face encounter by myself that meets the physician DME face-to-face encounter requirements with this patient on:    Date of encounter:   Patient:                    MRN:                       YOB: 2023  Nancy Carcamo  [id number]  1975     The encounter with the patient was in whole, or in part, for the following medical condition, which is the primary reason for durable medical equipment:  Covid-19 Infection    I certify that, based on my findings, the following durable medical equipment is medically necessary:  Oxygen.    HOME O2 Saturation Measurements:(Values must be present for Home Oxygen orders)  Room air sat at rest: 88  Room air sat with amb: 82  With liters of O2: 1, O2 sat at rest with O2: 94  With Liters of O2: 2, O2 sat with amb with O2 : 91  Is the patient mobile?: Yes    My Clinical findings support the need for the above equipment due to:  Hypoxia    Supporting Symptoms: The patient requires supplemental oxygen, as the following interventions have been tried with limited or no improvement: \"Ambulation with oximetry and \"Incentive spirometry    If patient feels more short of breath, they can go up to 6 liters per minute and contact healthcare provider.  "

## 2021-08-20 NOTE — ED PROVIDER NOTES
ED Provider Note    Scribed for Christina Hooks M.D. by Fabian Pringle. 8/20/2021  1:40 AM    Means of arrival: Walk-in  History obtained from: Patient  History limited by: None      CHIEF COMPLAINT  Chief Complaint   Patient presents with   • Flu Like Symptoms   • Nausea/Vomiting/Diarrhea   • Coronavirus Screening   • Shortness of Breath       HPI  Nancy Carcamo is a 46 y.o. female who presents to the Emergency Department for continued shortness of breath, chest pressure with nausea, vomiting, and diarrhea. Patient tested positive for COVID on 8/17, and her symptoms started 5 days ago. She has been taking 1000 mg Tylenol every 4 hours with little relief. Patient has not taken her temperature, but has a tactile fever. She describes he chest pain as pressure like. Patient denies any leg swelling. She has a history of diabetes, but is not currently taking anything for it. Patient received her first vaccine dosage a couple weeks ago.     REVIEW OF SYSTEMS  Pertinent positive include shortness of breath, chest pressure, nausea, vomiting, diarrhea, and tactile fever. Pertinent negative include no leg swelling or pain. All other systems reviewed and are negative.      PAST MEDICAL HISTORY   has a past medical history of Cyst, ovarian, Diabetes (2006), OAB (overactive bladder) (10/18/2016), and Snoring.    SOCIAL HISTORY  Social History     Tobacco Use   • Smoking status: Never Smoker   • Smokeless tobacco: Never Used   Vaping Use   • Vaping Use: Never used   Substance and Sexual Activity   • Alcohol use: Not Currently     Alcohol/week: 0.0 oz     Comment: Infrequent drinking   • Drug use: No   • Sexual activity: Yes     Partners: Male     Comment: pregnant now.       SURGICAL HISTORY   has a past surgical history that includes cystectomy (2002); ovarian cystectomy (Bilateral, 8/4/2016); and vaginal hysterectomy scope total (Bilateral, 8/4/2016).    CURRENT MEDICATIONS  Current Outpatient Medications   Medication  "Instructions   • atorvastatin (LIPITOR) 20 mg, Oral, DAILY   • Blood Glucose Monitoring Suppl Device Meter: Dispense Abbott Freestyle Lite meter. Sig. Use daily as directed for blood sugar monitoring.   • Blood Glucose Monitoring Suppl SUPPLIES Misc Test strips order: Test strips for Abbott Freestyle Lite meter. Sig: use Daily and prn ssx high or low sugar   • Lancets Misc Lancets order: Lancets for Abbott Freestyle Lite meter. Sig: use daily and prn ssx high or low sugar.   • metFORMIN ER (GLUCOPHAGE XR) 500 mg, Oral, 2 TIMES DAILY WITH MEALS   • phentermine (ADIPEX-P) 37.5 mg, Oral, EVERY MORNING BEFORE BREAKFAST   • tolterodine ER (DETROL LA) 4 MG CAPSULE SR 24 HR TAKE ONE CAPSULE BY MOUTH EVERY DAY   • vitamin D, Ergocalciferol, (DRISDOL) 95704 UNITS Cap capsule TAKE 1 CAP BY MOUTH EVERY 7 DAYS. LABS EVERY THREE MONTHS, DO NOT REFILL UNTIL AFTER LABS.       ALLERGIES  Allergies   Allergen Reactions   • Nkda [No Known Drug Allergy]        PHYSICAL EXAM   VITAL SIGNS: /68   Pulse (!) 101   Temp 37.8 °C (100.1 °F) (Temporal)   Resp (!) 22   Ht 1.575 m (5' 2\")   Wt 68 kg (149 lb 14.6 oz)   LMP 07/26/2014   SpO2 96%   BMI 27.42 kg/m²    Constitutional: Non-toxic, Ill appearing, Alert in no apparent distress.  HENT: Normocephalic, Atraumatic. Bilateral external ears normal. Nose normal.  Moist mucous membranes.  Oropharynx clear.  Eyes: Pupils are equal and reactive. Conjunctiva normal.   Neck: Supple, full range of motion  Heart: Regular rate and rhythm.  No murmurs.    Lungs: No respiratory distress, normal work of breathing. Lungs clear to auscultation bilaterally.  Abdomen Soft, no distention.  No tenderness to palpation.  Musculoskeletal: Atraumatic. No obvious deformities noted.  No lower extremity edema.  Skin: Warm, Dry.  No erythema, No rash.   Neurologic: Alert and oriented x3. Moving all extremities spontaneously without focal deficits.  Psychiatric: Affect normal, Mood normal, Appears " appropriate and not intoxicated.      DIAGNOSTIC STUDIES    EKG  Results for orders placed or performed during the hospital encounter of 21   EKG   Result Value Ref Range    Report       [facility] Emergency Dept.    Test Date:  2021  Pt Name:    LAY PEDRAZA                Department: ER  MRN:        [id number]                      Room:  Gender:     Female                       Technician: [id number]  :        1975                   Requested By:ER TRIAGE PROTOCOL  Order #:    [id number]                    Reading MD: Christina Hooks MD    Measurements  Intervals                                Axis  Rate:       97                           P:          61  MO:         132                          QRS:        78  QRSD:       80                           T:          22  QT:         352  QTc:        447    Interpretive Statements  SINUS RHYTHM  No ectopy or hypertrophy  No ST or T wave change  No previous ECG available for comparison  Electronically Signed On 2021 1:52:26 PDT by Christina Hooks MD           LABS  Personally reviewed by me  Labs Reviewed   CBC WITH DIFFERENTIAL - Abnormal; Notable for the following components:       Result Value    MCHC 32.0 (*)     Neutrophils-Polys 78.20 (*)     Lymphocytes 18.20 (*)     Neutrophils (Absolute) 7.76 (*)     All other components within normal limits    Narrative:     Enhanced Droplet, Contact, and Eye Protection   COMP METABOLIC PANEL - Abnormal; Notable for the following components:    Potassium 3.4 (*)     Anion Gap 17.0 (*)     Glucose 115 (*)     Total Protein 8.3 (*)     Globulin 4.2 (*)     All other components within normal limits    Narrative:     Enhanced Droplet, Contact, and Eye Protection   LACTIC ACID - Abnormal; Notable for the following components:    Lactic Acid 2.3 (*)     All other components within normal limits    Narrative:     Enhanced Droplet, Contact, and Eye Protection   TROPONIN    Narrative:     Enhanced  Droplet, Contact, and Eye Protection   ESTIMATED GFR    Narrative:     Enhanced Droplet, Contact, and Eye Protection   LACTIC ACID         RADIOLOGY  Personally reviewed by me  DX-CHEST-PORTABLE (1 VIEW)   Final Result      Mild pulmonary opacity compatible with Covid pneumonia          ED COURSE  Vitals:    08/20/21 0051 08/20/21 0321 08/20/21 0325 08/20/21 0401   BP: 103/68  110/57 107/61   Pulse: (!) 101 93 93 90   Resp: (!) 22 20 17 (!) 22   Temp: 37.8 °C (100.1 °F)  37.6 °C (99.7 °F)    TempSrc: Temporal  Temporal    SpO2: 96% (!) 84% 92% 93%   Weight:       Height:             Medications administered:  Medications   dexamethasone (DECADRON) tablet 6 mg (has no administration in time range)   potassium chloride SA (Kdur) tablet 40 mEq (has no administration in time range)   ondansetron (ZOFRAN ODT) dispertab 4 mg (4 mg Oral Given 8/19/21 2314)   NS infusion 1,000 mL (1,000 mL Intravenous New Bag 8/20/21 0158)   ketorolac (TORADOL) injection 15 mg (15 mg Intravenous Given 8/20/21 0204)   Patient was given IV fluids for vomiting and tachycardia.  IV hydration was used because oral hydration was not adequate alone.  Following fluid administration patient's hydration status and vital signs were improved.      1:40 AM Patient seen and examined at bedside. The patient presents with multiple symptoms in the setting of COVID. Ordered for DX-chest, Troponin, CBC w/ diff, CMP, Lactic acid, and EKG to evaluate. Patient will be treated with Toradol 15 mg, Magnesium sulfate, and Zofran 4 mg for her symptoms.       MEDICAL DECISION MAKING  Patient with known Covid infection who has been sick for the last week and now presenting with chest pain and shortness of breath as well as persistent vomiting.  She is afebrile, tachycardic on arrival with otherwise reassuring vital signs.  Initially she did not show hypoxia or respiratory distress however did become hypoxic while here in the department.  Labs are significant for a mild  elevation of lactate however no associated leukocytosis or other signs of sepsis.  Potassium was mildly low and patient has other signs of dehydration on CHEM panel.  Her EKG does not show ischemia or arrhythmia.  Troponin is negative, doubt ACS or pulmonary embolism.  Chest x-ray does show signs of Covid pneumonia.  There is no focal infiltrate concerning for bacterial infection.    4:18 AM Patient dropped to the 80's on room air trial. Will plan for hospitalization and initiation of steroids.  Patient understands plan of care for admission and is agreeable at this time.    4:30 AM I discussed the case with Dr. Dumont, hospitalist on-call, who accepts admission of the patient.  The patient is in stable condition at the time of transfer to the floor.      DISPOSITION:  Patient will be hospitalized by Dr. Dumont in guarded condition.    IMPRESSION  (U07.1) COVID-19 virus infection  (J96.01) Acute respiratory failure with hypoxia (HCC)  (R11.2) Non-intractable vomiting with nausea, unspecified vomiting type    Results, diagnoses, and treatment options were discussed with the patient and/or family. Patient verbalized understanding of plan of care.    New Prescriptions    No medications on file       Fabian WALKER ([employer]), am scribing for, and in the presence of, Christina Hooks M.D..    Electronically signed by: Fabian Pringle ([place]), 8/20/2021    IChristina M.D. personally performed the services described in this documentation, as scribed by Fabian Pringle in my presence, and it is both accurate and complete.     The note accurately reflects work and decisions made by me.  Christina Hooks M.D.  8/20/2021  4:50 AM

## 2021-08-20 NOTE — PROGRESS NOTES
Patient seen and examined, admitted early this morning for acute respiratory failure due to covid infection   Patient has been started on dexamethasone,  RT protocol, inhalers and also abx.  Denies any CP. Continues to have nausea and feeling SOB.   Wean of O2 as tolerated   For more info please refer to Dr. Dumont H&P.

## 2021-08-20 NOTE — ED NOTES
0730 - Late entry. Report received, assuming care.  Pt amb w/ a steady gait to the restroom & back.  C/o mild sob w/ exertion.   Pt denies pain.

## 2021-08-20 NOTE — RESPIRATORY CARE
REMOTE MONITORING PROGRAM by RESPIRATORY THERAPY  8/20/2021 at 7:38 AM by Belkys Padilla, RRT     Patient chart reviewed for COVID remote monitoring eligibility. Patient does not qualify for RPM due to insurance at this time

## 2021-08-20 NOTE — PROGRESS NOTES
Pt transferred to [facility]-2 on zoll by ACLS RN. Assumed care of pt, received bedside report from LAY Barnes. Pt sitting up in bed, no complaints of pain, 1L O2 nasal cannula, A/Ox4. Fall and safety precautions in place. Discussed POC with pt, pt verbalizes understanding. No further needs at this time.

## 2021-08-20 NOTE — H&P
Hospital Medicine History & Physical Note    Date of Service  8/20/2021    Primary Care Physician  EVAN Multani    Consultants    Specialist Names:     Code Status  Full Code    Chief Complaint  Chief Complaint   Patient presents with   • Flu Like Symptoms   • Nausea/Vomiting/Diarrhea   • Coronavirus Screening   • Shortness of Breath       History of Presenting Illness  Nancy Carcamo is a 46 y.o. female with a past medical hx of DM2, HLD who presented 8/20/2021 with continued shortness of breath, chest pressure with nausea, vomiting, and diarrhea. Patient tested positive for COVID on 8/17, and her symptoms started 5 days ago. Patient received her first dose of COVID vaccine approx one month prior.    Chest xray showing: Mild pulmonary opacity compatible with Covid pneumonia     Notable findings include: RR 22, , glucose 115, K 3.4, LA 2.3    Patient is admitted to hospitalist service for medical management.     Review of Systems  Review of Systems   Constitutional: Positive for malaise/fatigue.   Respiratory: Positive for shortness of breath.    Gastrointestinal: Positive for diarrhea, nausea and vomiting.   Musculoskeletal: Positive for myalgias.   All other systems reviewed and are negative.      Past Medical History   has a past medical history of Cyst, ovarian, Diabetes (2006), OAB (overactive bladder) (10/18/2016), and Snoring. reviewed.    Surgical History   has a past surgical history that includes cystectomy (2002); ovarian cystectomy (Bilateral, 8/4/2016); and vaginal hysterectomy scope total (Bilateral, 8/4/2016).  Reviewed.    Family History  family history includes Anxiety disorder in her daughter; Cancer in her maternal aunt; Depression in her daughter; Rheumatologic Disease in her daughter; Thyroid in her daughter.   Family history reviewed with patient. There is no family history that is pertinent to the chief complaint.     Social History   reports that she has never smoked.  She has never used smokeless tobacco. She reports previous alcohol use. She reports that she does not use drugs. reviewed.    Allergies  Allergies   Allergen Reactions   • Nkda [No Known Drug Allergy]        Medications  Prior to Admission Medications   Prescriptions Last Dose Informant Patient Reported? Taking?   Blood Glucose Monitoring Suppl Device not taking  No No   Sig: Meter: Dispense Abbott Freestyle Lite meter. Sig. Use daily as directed for blood sugar monitoring.   Blood Glucose Monitoring Suppl SUPPLIES Misc not taking  No No   Sig: Test strips order: Test strips for Abbott Freestyle Lite meter. Sig: use Daily and prn ssx high or low sugar   Lancets Misc   No No   Sig: Lancets order: Lancets for Abbott Freestyle Lite meter. Sig: use daily and prn ssx high or low sugar.   atorvastatin (LIPITOR) 20 MG Tab not taking  No No   Sig: TAKE 1 TAB BY MOUTH EVERY DAY.   metFORMIN ER (GLUCOPHAGE XR) 500 MG TABLET SR 24 HR not taking  No No   Sig: TAKE 1 TAB BY MOUTH 2 TIMES A DAY, WITH MEALS.   phentermine (ADIPEX-P) 37.5 MG tablet not taking  No No   Sig: Take 1 Tab by mouth every morning before breakfast.   tolterodine ER (DETROL LA) 4 MG CAPSULE SR 24 HR not taking  No No   Sig: TAKE ONE CAPSULE BY MOUTH EVERY DAY   vitamin D, Ergocalciferol, (DRISDOL) 69681 UNITS Cap capsule not taking  No No   Sig: TAKE 1 CAP BY MOUTH EVERY 7 DAYS. LABS EVERY THREE MONTHS, DO NOT REFILL UNTIL AFTER LABS.      Facility-Administered Medications: None       Physical Exam  Temp:  [37.6 °C (99.7 °F)-37.8 °C (100.1 °F)] 37.6 °C (99.7 °F)  Pulse:  [] 84  Resp:  [16-22] 22  BP: (103-112)/(57-75) 107/58  SpO2:  [84 %-98 %] 92 %    Physical Exam     Constitutional: acute respiratory distress requiring 2L NC  HENT: Normocephalic, no obvious evidence of acute trauma.  Eyes: No scleral icterus. Normal conjunctiva   Neck: Comfortable movement without any obvious restriction in the range of motion.  Cardiovascular: tachycardia  Thorax  & Lungs: Acute respiratory distress.  rhonchi heard on ausculation.  there is no obvious chest wall tenderness. I appreciate normal air movement throughout.   Abdomen: The abdomen is not visibly distended. Upon palpation, I find it to be without tenderness.  No mass appreciated.  Skin: The exposed portions of skin reveal no obvious rash or other abnormalities.  Extremities/Musculoskeletal: no lower extremity edema with no asymmetry.  Neurologic: Alert & oriented. No focal deficits observed.   Psychiatric: Normal affect appropriate for the clinical situation.    Laboratory:  Recent Labs     08/20/21 0200   WBC 9.9   RBC 4.98   HEMOGLOBIN 13.9   HEMATOCRIT 43.4   MCV 87.1   MCH 27.9   MCHC 32.0*   RDW 45.5   PLATELETCT 262   MPV 10.6     Recent Labs     08/20/21 0200   SODIUM 135   POTASSIUM 3.4*   CHLORIDE 97   CO2 21   GLUCOSE 115*   BUN 9   CREATININE 0.71   CALCIUM 9.1     Recent Labs     08/20/21 0200   ALTSGPT 37   ASTSGOT 44   ALKPHOSPHAT 60   TBILIRUBIN 0.4   GLUCOSE 115*         No results for input(s): NTPROBNP in the last 72 hours.      Recent Labs     08/20/21  0200   TROPONINT <6       Imaging:  DX-CHEST-PORTABLE (1 VIEW)   Final Result      Mild pulmonary opacity compatible with Covid pneumonia          Assessment/Plan:  I anticipate this patient will require at least two midnights for appropriate medical management, necessitating inpatient admission.    Acute respiratory disease due to COVID-19 virus- (present on admission)  Assessment & Plan  - Conservative use of IVF  - Albuterol, O2 therapy, and proning as needed  - robitussin/guaifenacin for Sx management  - Decadron 6mg qd  - Holding abx, f/u w/ Procal  - f/u Blood Cultures, Sputum Cx  - Check D-dimer, CRP, Lactic Acid  - Lovenox for VTE ppx  - Consider ID consult for Remdesivir//Tocolizumab    Tachycardia- (present on admission)  Assessment & Plan  Admitted with telemetry  EKG showing sinus tachycardia  Continue to monitor    Dyslipidemia  associated with type 2 diabetes mellitus (HCC)- (present on admission)  Assessment & Plan  Lipid panel ordered to assess  Continue home statin    Controlled type 2 diabetes mellitus without complication, without long-term current use of insulin (HCC)- (present on admission)  Assessment & Plan  a1C ordered to assess  Glucose 115 on admission      VTE prophylaxis: SCDs/TEDs Lovenox ppx   No

## 2021-08-20 NOTE — ED NOTES
Pt medicated as ordered.  Ice water & pillow provided.  Pt states no other needs at this time. Awaiting a bed assignment, pt updated.

## 2021-08-20 NOTE — ED TRIAGE NOTES
Pt is ambulatory to ED with complaints of SOB, chest pressure and flu like symptoms. She tested positive for covid 8/17. She reports shortness with ALDs. She does have some chest pressure. +vomiting and diarrhea.     EKG requested.   Mask in place. Pt asked to isolated in [facility] [place] [facility].     Pt educated on ED process and asked to wait in lobby. Patient educated on importance of alerting staff to new or worsening symptoms or concerns.

## 2021-08-21 VITALS
WEIGHT: 160.94 LBS | OXYGEN SATURATION: 97 % | TEMPERATURE: 97 F | HEIGHT: 62 IN | BODY MASS INDEX: 29.62 KG/M2 | RESPIRATION RATE: 18 BRPM | DIASTOLIC BLOOD PRESSURE: 69 MMHG | HEART RATE: 75 BPM | SYSTOLIC BLOOD PRESSURE: 115 MMHG

## 2021-08-21 LAB
ALBUMIN SERPL BCP-MCNC: 3.5 G/DL (ref 3.2–4.9)
ALBUMIN/GLOB SERPL: 0.9 G/DL
ALP SERPL-CCNC: 53 U/L (ref 30–99)
ALT SERPL-CCNC: 31 U/L (ref 2–50)
ANION GAP SERPL CALC-SCNC: 13 MMOL/L (ref 7–16)
AST SERPL-CCNC: 29 U/L (ref 12–45)
BASOPHILS # BLD AUTO: 0.1 % (ref 0–1.8)
BASOPHILS # BLD: 0.01 K/UL (ref 0–0.12)
BILIRUB SERPL-MCNC: 0.2 MG/DL (ref 0.1–1.5)
BUN SERPL-MCNC: 13 MG/DL (ref 8–22)
CALCIUM SERPL-MCNC: 8.9 MG/DL (ref 8.5–10.5)
CHLORIDE SERPL-SCNC: 104 MMOL/L (ref 96–112)
CHOLEST SERPL-MCNC: 130 MG/DL (ref 100–199)
CO2 SERPL-SCNC: 24 MMOL/L (ref 20–33)
CREAT SERPL-MCNC: 0.72 MG/DL (ref 0.5–1.4)
EOSINOPHIL # BLD AUTO: 0 K/UL (ref 0–0.51)
EOSINOPHIL NFR BLD: 0 % (ref 0–6.9)
ERYTHROCYTE [DISTWIDTH] IN BLOOD BY AUTOMATED COUNT: 46.5 FL (ref 35.9–50)
GLOBULIN SER CALC-MCNC: 3.8 G/DL (ref 1.9–3.5)
GLUCOSE SERPL-MCNC: 153 MG/DL (ref 65–99)
HCT VFR BLD AUTO: 40.1 % (ref 37–47)
HDLC SERPL-MCNC: 22 MG/DL
HGB BLD-MCNC: 12.9 G/DL (ref 12–16)
IMM GRANULOCYTES # BLD AUTO: 0.05 K/UL (ref 0–0.11)
IMM GRANULOCYTES NFR BLD AUTO: 0.5 % (ref 0–0.9)
LDLC SERPL CALC-MCNC: 68 MG/DL
LYMPHOCYTES # BLD AUTO: 1.71 K/UL (ref 1–4.8)
LYMPHOCYTES NFR BLD: 18.2 % (ref 22–41)
MCH RBC QN AUTO: 28.2 PG (ref 27–33)
MCHC RBC AUTO-ENTMCNC: 32.2 G/DL (ref 33.6–35)
MCV RBC AUTO: 87.7 FL (ref 81.4–97.8)
MONOCYTES # BLD AUTO: 0.34 K/UL (ref 0–0.85)
MONOCYTES NFR BLD AUTO: 3.6 % (ref 0–13.4)
NEUTROPHILS # BLD AUTO: 7.28 K/UL (ref 2–7.15)
NEUTROPHILS NFR BLD: 77.6 % (ref 44–72)
NRBC # BLD AUTO: 0 K/UL
NRBC BLD-RTO: 0 /100 WBC
PLATELET # BLD AUTO: 261 K/UL (ref 164–446)
PMV BLD AUTO: 10.7 FL (ref 9–12.9)
POTASSIUM SERPL-SCNC: 4.3 MMOL/L (ref 3.6–5.5)
PROT SERPL-MCNC: 7.3 G/DL (ref 6–8.2)
RBC # BLD AUTO: 4.57 M/UL (ref 4.2–5.4)
SODIUM SERPL-SCNC: 141 MMOL/L (ref 135–145)
TRIGL SERPL-MCNC: 198 MG/DL (ref 0–149)
WBC # BLD AUTO: 9.4 K/UL (ref 4.8–10.8)

## 2021-08-21 PROCEDURE — 700102 HCHG RX REV CODE 250 W/ 637 OVERRIDE(OP): Performed by: STUDENT IN AN ORGANIZED HEALTH CARE EDUCATION/TRAINING PROGRAM

## 2021-08-21 PROCEDURE — A9270 NON-COVERED ITEM OR SERVICE: HCPCS | Performed by: INTERNAL MEDICINE

## 2021-08-21 PROCEDURE — 80053 COMPREHEN METABOLIC PANEL: CPT

## 2021-08-21 PROCEDURE — 36415 COLL VENOUS BLD VENIPUNCTURE: CPT

## 2021-08-21 PROCEDURE — 700111 HCHG RX REV CODE 636 W/ 250 OVERRIDE (IP): Performed by: STUDENT IN AN ORGANIZED HEALTH CARE EDUCATION/TRAINING PROGRAM

## 2021-08-21 PROCEDURE — 700111 HCHG RX REV CODE 636 W/ 250 OVERRIDE (IP): Performed by: INTERNAL MEDICINE

## 2021-08-21 PROCEDURE — 80061 LIPID PANEL: CPT

## 2021-08-21 PROCEDURE — 700102 HCHG RX REV CODE 250 W/ 637 OVERRIDE(OP): Performed by: INTERNAL MEDICINE

## 2021-08-21 PROCEDURE — 99238 HOSP IP/OBS DSCHRG MGMT 30/<: CPT | Performed by: STUDENT IN AN ORGANIZED HEALTH CARE EDUCATION/TRAINING PROGRAM

## 2021-08-21 PROCEDURE — 85025 COMPLETE CBC W/AUTO DIFF WBC: CPT

## 2021-08-21 PROCEDURE — 700105 HCHG RX REV CODE 258: Performed by: INTERNAL MEDICINE

## 2021-08-21 PROCEDURE — A9270 NON-COVERED ITEM OR SERVICE: HCPCS | Performed by: STUDENT IN AN ORGANIZED HEALTH CARE EDUCATION/TRAINING PROGRAM

## 2021-08-21 RX ORDER — BENZONATATE 100 MG/1
100 CAPSULE ORAL 3 TIMES DAILY PRN
Qty: 60 CAPSULE | Refills: 0 | Status: SHIPPED | OUTPATIENT
Start: 2021-08-21

## 2021-08-21 RX ORDER — DEXAMETHASONE 6 MG/1
6 TABLET ORAL DAILY
Qty: 7 TABLET | Refills: 0 | Status: SHIPPED | OUTPATIENT
Start: 2021-08-22 | End: 2021-08-29

## 2021-08-21 RX ORDER — AMOXICILLIN AND CLAVULANATE POTASSIUM 875; 125 MG/1; MG/1
1 TABLET, FILM COATED ORAL 2 TIMES DAILY
Qty: 10 TABLET | Refills: 0 | Status: SHIPPED | OUTPATIENT
Start: 2021-08-21 | End: 2021-08-26

## 2021-08-21 RX ADMIN — SODIUM CHLORIDE 3 G: 900 INJECTION INTRAVENOUS at 05:31

## 2021-08-21 RX ADMIN — AZITHROMYCIN MONOHYDRATE 500 MG: 250 TABLET ORAL at 05:30

## 2021-08-21 RX ADMIN — ENOXAPARIN SODIUM 40 MG: 40 INJECTION SUBCUTANEOUS at 05:31

## 2021-08-21 RX ADMIN — ATORVASTATIN CALCIUM 20 MG: 20 TABLET, FILM COATED ORAL at 05:30

## 2021-08-21 RX ADMIN — DEXAMETHASONE 6 MG: 4 TABLET ORAL at 05:30

## 2021-08-21 RX ADMIN — GUAIFENESIN 600 MG: 600 TABLET, EXTENDED RELEASE ORAL at 05:30

## 2021-08-21 RX ADMIN — SODIUM CHLORIDE 3 G: 900 INJECTION INTRAVENOUS at 12:14

## 2021-08-21 NOTE — DISCHARGE INSTRUCTIONS
Discharge Instructions    Discharged to home by car with friend. Discharged via wheelchair, hospital escort: Yes.  Special equipment needed: Not Applicable    Be sure to schedule a follow-up appointment with your primary care doctor or any specialists as instructed.     Discharge Plan:        I understand that a diet low in cholesterol, fat, and sodium is recommended for good health. Unless I have been given specific instructions below for another diet, I accept this instruction as my diet prescription.   Other diet: Heart healthy    Special Instructions: None    · Is patient discharged on Warfarin / Coumadin?   No       COVID-19  COVID-19 is a respiratory infection that is caused by a virus called severe acute respiratory syndrome coronavirus 2 (SARS-CoV-2). The disease is also known as coronavirus disease or novel coronavirus. In some people, the virus may not cause any symptoms. In others, it may cause a serious infection. The infection can get worse quickly and can lead to complications, such as:  · Pneumonia, or infection of the lungs.  · Acute respiratory distress syndrome or ARDS. This is fluid build-up in the lungs.  · Acute respiratory failure. This is a condition in which there is not enough oxygen passing from the lungs to the body.  · Sepsis or septic shock. This is a serious bodily reaction to an infection.  · Blood clotting problems.  · Secondary infections due to bacteria or fungus.  The virus that causes COVID-19 is contagious. This means that it can spread from person to person through droplets from coughs and sneezes (respiratory secretions).  What are the causes?  This illness is caused by a virus. You may catch the virus by:  · Breathing in droplets from an infected person's cough or sneeze.  · Touching something, like a table or a doorknob, that was exposed to the virus (contaminated) and then touching your mouth, nose, or eyes.  What increases the risk?  Risk for infection  You are more likely  to be infected with this virus if you:  · Live in or travel to an area with a COVID-19 outbreak.  · Come in contact with a sick person who recently traveled to an area with a COVID-19 outbreak.  · Provide care for or live with a person who is infected with COVID-19.  Risk for serious illness  You are more likely to become seriously ill from the virus if you:  · Are 65 years of age or older.  · Have a long-term disease that lowers your body's ability to fight infection (immunocompromised).  · Live in a nursing home or long-term care facility.  · Have a long-term (chronic) disease such as:  ? Chronic lung disease, including chronic obstructive pulmonary disease or asthma  ? Heart disease.  ? Diabetes.  ? Chronic kidney disease.  ? Liver disease.  · Are obese.  What are the signs or symptoms?  Symptoms of this condition can range from mild to severe. Symptoms may appear any time from 2 to 14 days after being exposed to the virus. They include:  · A fever.  · A cough.  · Difficulty breathing.  · Chills.  · Muscle pains.  · A sore throat.  · Loss of taste or smell.  Some people may also have stomach problems, such as nausea, vomiting, or diarrhea.  Other people may not have any symptoms of COVID-19.  How is this diagnosed?  This condition may be diagnosed based on:  · Your signs and symptoms, especially if:  ? You live in an area with a COVID-19 outbreak.  ? You recently traveled to or from an area where the virus is common.  ? You provide care for or live with a person who was diagnosed with COVID-19.  · A physical exam.  · Lab tests, which may include:  ? A nasal swab to take a sample of fluid from your nose.  ? A throat swab to take a sample of fluid from your throat.  ? A sample of mucus from your lungs (sputum).  ? Blood tests.  · Imaging tests, which may include, X-rays, CT scan, or ultrasound.  How is this treated?  At present, there is no medicine to treat COVID-19. Medicines that treat other diseases are being  used on a trial basis to see if they are effective against COVID-19.  Your health care provider will talk with you about ways to treat your symptoms. For most people, the infection is mild and can be managed at home with rest, fluids, and over-the-counter medicines.  Treatment for a serious infection usually takes places in a hospital intensive care unit (ICU). It may include one or more of the following treatments. These treatments are given until your symptoms improve.  · Receiving fluids and medicines through an IV.  · Supplemental oxygen. Extra oxygen is given through a tube in the nose, a face mask, or a rodrigez.  · Positioning you to lie on your stomach (prone position). This makes it easier for oxygen to get into the lungs.  · Continuous positive airway pressure (CPAP) or bi-level positive airway pressure (BPAP) machine. This treatment uses mild air pressure to keep the airways open. A tube that is connected to a motor delivers oxygen to the body.  · Ventilator. This treatment moves air into and out of the lungs by using a tube that is placed in your windpipe.  · Tracheostomy. This is a procedure to create a hole in the neck so that a breathing tube can be inserted.  · Extracorporeal membrane oxygenation (ECMO). This procedure gives the lungs a chance to recover by taking over the functions of the heart and lungs. It supplies oxygen to the body and removes carbon dioxide.  Follow these instructions at home:  Lifestyle  · If you are sick, stay home except to get medical care. Your health care provider will tell you how long to stay home. Call your health care provider before you go for medical care.  · Rest at home as told by your health care provider.  · Do not use any products that contain nicotine or tobacco, such as cigarettes, e-cigarettes, and chewing tobacco. If you need help quitting, ask your health care provider.  · Return to your normal activities as told by your health care provider. Ask your health  care provider what activities are safe for you.  General instructions  · Take over-the-counter and prescription medicines only as told by your health care provider.  · Drink enough fluid to keep your urine pale yellow.  · Keep all follow-up visits as told by your health care provider. This is important.  How is this prevented?    There is no vaccine to help prevent COVID-19 infection. However, there are steps you can take to protect yourself and others from this virus.  To protect yourself:   · Do not travel to areas where COVID-19 is a risk. The areas where COVID-19 is reported change often. To identify high-risk areas and travel restrictions, check the CDC travel website: wwwnc.cdc.gov/travel/notices  · If you live in, or must travel to, an area where COVID-19 is a risk, take precautions to avoid infection.  ? Stay away from people who are sick.  ? Wash your hands often with soap and water for 20 seconds. If soap and water are not available, use an alcohol-based hand .  ? Avoid touching your mouth, face, eyes, or nose.  ? Avoid going out in public, follow guidance from your state and local health authorities.  ? If you must go out in public, wear a cloth face covering or face mask.  ? Disinfect objects and surfaces that are frequently touched every day. This may include:  § Counters and tables.  § Doorknobs and light switches.  § Sinks and faucets.  § Electronics, such as phones, remote controls, keyboards, computers, and tablets.  To protect others:  If you have symptoms of COVID-19, take steps to prevent the virus from spreading to others.  · If you think you have a COVID-19 infection, contact your health care provider right away. Tell your health care team that you think you may have a COVID-19 infection.  · Stay home. Leave your house only to seek medical care. Do not use public transport.  · Do not travel while you are sick.  · Wash your hands often with soap and water for 20 seconds. If soap and  water are not available, use alcohol-based hand .  · Stay away from other members of your household. Let healthy household members care for children and pets, if possible. If you have to care for children or pets, wash your hands often and wear a mask. If possible, stay in your own room, separate from others. Use a different bathroom.  · Make sure that all people in your household wash their hands well and often.  · Cough or sneeze into a tissue or your sleeve or elbow. Do not cough or sneeze into your hand or into the air.  · Wear a cloth face covering or face mask.  Where to find more information  · Centers for Disease Control and Prevention: www.cdc.gov/coronavirus/2019-ncov/index.html  · World Health Organization: www.who.int/health-topics/coronavirus  Contact a health care provider if:  · You live in or have traveled to an area where COVID-19 is a risk and you have symptoms of the infection.  · You have had contact with someone who has COVID-19 and you have symptoms of the infection.  Get help right away if:  · You have trouble breathing.  · You have pain or pressure in your chest.  · You have confusion.  · You have bluish lips and fingernails.  · You have difficulty waking from sleep.  · You have symptoms that get worse.  These symptoms may represent a serious problem that is an emergency. Do not wait to see if the symptoms will go away. Get medical help right away. Call your local emergency services (911 in the [place] Do not drive yourself to the hospital. Let the emergency medical personnel know if you think you have COVID-19.  Summary  · COVID-19 is a respiratory infection that is caused by a virus. It is also known as coronavirus disease or novel coronavirus. It can cause serious infections, such as pneumonia, acute respiratory distress syndrome, acute respiratory failure, or sepsis.  · The virus that causes COVID-19 is contagious. This means that it can spread from person to person through  droplets from coughs and sneezes.  · You are more likely to develop a serious illness if you are 65 years of age or older, have a weak immunity, live in a nursing home, or have chronic disease.  · There is no medicine to treat COVID-19. Your health care provider will talk with you about ways to treat your symptoms.  · Take steps to protect yourself and others from infection. Wash your hands often and disinfect objects and surfaces that are frequently touched every day. Stay away from people who are sick and wear a mask if you are sick.  This information is not intended to replace advice given to you by your health care provider. Make sure you discuss any questions you have with your health care provider.  Document Released: 01/23/2020 Document Revised: 05/14/2020 Document Reviewed: 01/23/2020  Elsevier Patient Education © 2020 [employer]      COVID-19 Frequently Asked Questions  COVID-19 (coronavirus disease) is an infection that is caused by a large family of viruses. Some viruses cause illness in people and others cause illness in animals like camels, cats, and bats. In some cases, the viruses that cause illness in animals can spread to humans.  Where did the coronavirus come from?  In December 2019, [place] told the World Health Organization (WHO) of several cases of lung disease (human respiratory illness). These cases were linked to an open seafood and livestock market in the city of [place]. The link to the seafood and livestock market suggests that the virus may have spread from animals to humans. However, since that first outbreak in December, the virus has also been shown to spread from person to person.  What is the name of the disease and the virus?  Disease name  Early on, this disease was called novel coronavirus. This is because scientists determined that the disease was caused by a new (novel) respiratory virus. The World Health Organization (WHO) has now named the disease COVID-19, or coronavirus  disease.  Virus name  The virus that causes the disease is called severe acute respiratory syndrome coronavirus 2 (SARS-CoV-2).  More information on disease and virus naming  World Health Organization (WHO): www.who.int/emergencies/diseases/novel-coronavirus-2019/technical-guidance/naming-the-coronavirus-disease-(covid-2019)-and-the-virus-that-causes-it  Who is at risk for complications from coronavirus disease?  Some people may be at higher risk for complications from coronavirus disease. This includes older adults and people who have chronic diseases, such as heart disease, diabetes, and lung disease.  If you are at higher risk for complications, take these extra precautions:  · Avoid close contact with people who are sick or have a fever or cough. Stay at least 3-6 ft (1-2 m) away from them, if possible.  · Wash your hands often with soap and water for at least 20 seconds.  · Avoid touching your face, mouth, nose, or eyes.  · Keep supplies on hand at home, such as food, medicine, and cleaning supplies.  · Stay home as much as possible.  · Avoid social gatherings and travel.  How does coronavirus disease spread?  The virus that causes coronavirus disease spreads easily from person to person (is contagious). There are also cases of community-spread disease. This means the disease has spread to:  · People who have no known contact with other infected people.  · People who have not traveled to areas where there are known cases.  It appears to spread from one person to another through droplets from coughing or sneezing.  Can I get the virus from touching surfaces or objects?  There is still a lot that we do not know about the virus that causes coronavirus disease. Scientists are basing a lot of information on what they know about similar viruses, such as:  · Viruses cannot generally survive on surfaces for long. They need a human body (host) to survive.  · It is more likely that the virus is spread by close contact  with people who are sick (direct contact), such as through:  ? Shaking hands or hugging.  ? Breathing in respiratory droplets that travel through the air. This can happen when an infected person coughs or sneezes on or near other people.  · It is less likely that the virus is spread when a person touches a surface or object that has the virus on it (indirect contact). The virus may be able to enter the body if the person touches a surface or object and then touches his or her face, eyes, nose, or mouth.  Can a person spread the virus without having symptoms of the disease?  It may be possible for the virus to spread before a person has symptoms of the disease, but this is most likely not the main way the virus is spreading. It is more likely for the virus to spread by being in close contact with people who are sick and breathing in the respiratory droplets of a sick person's cough or sneeze.  What are the symptoms of coronavirus disease?  Symptoms vary from person to person and can range from mild to severe. Symptoms may include:  · Fever.  · Cough.  · Tiredness, weakness, or fatigue.  · Fast breathing or feeling short of breath.  These symptoms can appear anywhere from 2 to 14 days after you have been exposed to the virus. If you develop symptoms, call your health care provider. People with severe symptoms may need hospital care.  If I am exposed to the virus, how long does it take before symptoms start?  Symptoms of coronavirus disease may appear anywhere from 2 to 14 days after a person has been exposed to the virus. If you develop symptoms, call your health care provider.  Should I be tested for this virus?  Your health care provider will decide whether to test you based on your symptoms, history of exposure, and your risk factors.  How does a health care provider test for this virus?  Health care providers will collect samples to send for testing. Samples may include:  · Taking a swab of fluid from the  nose.  · Taking fluid from the lungs by having you cough up mucus (sputum) into a sterile cup.  · Taking a blood sample.  · Taking a stool or urine sample.  Is there a treatment or vaccine for this virus?  Currently, there is no vaccine to prevent coronavirus disease. Also, there are no medicines like antibiotics or antivirals to treat the virus. A person who becomes sick is given supportive care, which means rest and fluids. A person may also relieve his or her symptoms by using over-the-counter medicines that treat sneezing, coughing, and runny nose. These are the same medicines that a person takes for the common cold.  If you develop symptoms, call your health care provider. People with severe symptoms may need hospital care.  What can I do to protect myself and my family from this virus?         You can protect yourself and your family by taking the same actions that you would take to prevent the spread of other viruses. Take the following actions:  · Wash your hands often with soap and water for at least 20 seconds. If soap and water are not available, use alcohol-based hand .  · Avoid touching your face, mouth, nose, or eyes.  · Cough or sneeze into a tissue, sleeve, or elbow. Do not cough or sneeze into your hand or the air.  ? If you cough or sneeze into a tissue, throw it away immediately and wash your hands.  · Disinfect objects and surfaces that you frequently touch every day.  · Avoid close contact with people who are sick or have a fever or cough. Stay at least 3-6 ft (1-2 m) away from them, if possible.  · Stay home if you are sick, except to get medical care. Call your health care provider before you get medical care.  · Make sure your vaccines are up to date. Ask your health care provider what vaccines you need.  What should I do if I need to travel?  Follow travel recommendations from your local health authority, the CDC, and WHO.  Travel information and advice  · Centers for Disease  Control and Prevention (CDC): www.cdc.gov/coronavirus/2019-ncov/travelers/index.html  · World Health Organization (WHO): www.who.int/emergencies/diseases/novel-coronavirus-2019/travel-advice  Know the risks and take action to protect your health  · You are at higher risk of getting coronavirus disease if you are traveling to areas with an outbreak or if you are exposed to travelers from areas with an outbreak.  · Wash your hands often and practice good hygiene to lower the risk of catching or spreading the virus.  What should I do if I am sick?  General instructions to stop the spread of infection  · Wash your hands often with soap and water for at least 20 seconds. If soap and water are not available, use alcohol-based hand .  · Cough or sneeze into a tissue, sleeve, or elbow. Do not cough or sneeze into your hand or the air.  · If you cough or sneeze into a tissue, throw it away immediately and wash your hands.  · Stay home unless you must get medical care. Call your health care provider or local health authority before you get medical care.  · Avoid public areas. Do not take public transportation, if possible.  · If you can, wear a mask if you must go out of the house or if you are in close contact with someone who is not sick.  Keep your home clean  · Disinfect objects and surfaces that are frequently touched every day. This may include:  ? Counters and tables.  ? Doorknobs and light switches.  ? Sinks and faucets.  ? Electronics such as phones, remote controls, keyboards, computers, and tablets.  · Wash dishes in hot, soapy water or use a . Air-dry your dishes.  · Wash laundry in hot water.  Prevent infecting other household members  · Let healthy household members care for children and pets, if possible. If you have to care for children or pets, wash your hands often and wear a mask.  · Sleep in a different bedroom or bed, if possible.  · Do not share personal items, such as razors,  toothbrushes, deodorant, kent, brushes, towels, and washcloths.  Where to find more information  Centers for Disease Control and Prevention (CDC)  · Information and news updates: www.cdc.gov/coronavirus/2019-ncov  World Health Organization (WHO)  · Information and news updates: www.who.int/emergencies/diseases/novel-coronavirus-2019  · Coronavirus health topic: www.who.int/health-topics/coronavirus  · Questions and answers on COVID-19: www.who.int/news-room/q-a-detail/v-o-kdjbnjgvzskjj  · Global tracker: who.[employer]  American Academy of Pediatrics (AAP)  · Information for families: www.healthychildren.org/English/health-issues/conditions/chest-lungs/Pages/2019-Novel-Coronavirus.aspx  The coronavirus situation is changing rapidly. Check your local health authority website or the CDC and WHO websites for updates and news.  When should I contact a health care provider?  · Contact your health care provider if you have symptoms of an infection, such as fever or cough, and you:  ? Have been near anyone who is known to have coronavirus disease.  ? Have come into contact with a person who is suspected to have coronavirus disease.  ? Have traveled outside of the country.  When should I get emergency medical care?  · Get help right away by calling your local emergency services (911 in the [place] if you have:  ? Trouble breathing.  ? Pain or pressure in your chest.  ? Confusion.  ? Blue-tinged lips and fingernails.  ? Difficulty waking from sleep.  ? Symptoms that get worse.  Let the emergency medical personnel know if you think you have coronavirus disease.  Summary  · A new respiratory virus is spreading from person to person and causing COVID-19 (coronavirus disease).  · The virus that causes COVID-19 appears to spread easily. It spreads from one person to another through droplets from coughing or sneezing.  · Older adults and those with chronic diseases are at higher risk of disease. If you are at higher risk for  complications, take extra precautions.  · There is currently no vaccine to prevent coronavirus disease. There are no medicines, such as antibiotics or antivirals, to treat the virus.  · You can protect yourself and your family by washing your hands often, avoiding touching your face, and covering your coughs and sneezes.  This information is not intended to replace advice given to you by your health care provider. Make sure you discuss any questions you have with your health care provider.  Document Released: 04/14/2020 Document Revised: 04/14/2020 Document Reviewed: 04/14/2020  Elsevier Patient Education © 2020 [employer]  Benzonatate capsules  What is this medicine?  BENZONATATE (margie ANÍBAL na nunes) is used to treat cough.  This medicine may be used for other purposes; ask your health care provider or pharmacist if you have questions.  COMMON BRAND NAME(S): Blanca Ayala  What should I tell my health care provider before I take this medicine?  They need to know if you have any of these conditions:  · kidney or liver disease  · an unusual or allergic reaction to benzonatate, anesthetics, other medicines, foods, dyes, or preservatives  · pregnant or trying to get pregnant  · breast-feeding  How should I use this medicine?  Take this medicine by mouth with a glass of water. Follow the directions on the prescription label. Avoid breaking, chewing, or sucking the capsule, as this can cause serious side effects. Take your medicine at regular intervals. Do not take your medicine more often than directed.  Talk to your pediatrician regarding the use of this medicine in children. While this drug may be prescribed for children as young as 10 years old for selected conditions, precautions do apply.  Overdosage: If you think you have taken too much of this medicine contact a poison control center or emergency room at once.  NOTE: This medicine is only for you. Do not share this medicine with others.  What if I  miss a dose?  If you miss a dose, take it as soon as you can. If it is almost time for your next dose, take only that dose. Do not take double or extra doses.  What may interact with this medicine?  Do not take this medicine with any of the following medications:  · MAOIs like Carbex, Eldepryl, Marplan, Nardil, and Parnate  This list may not describe all possible interactions. Give your health care provider a list of all the medicines, herbs, non-prescription drugs, or dietary supplements you use. Also tell them if you smoke, drink alcohol, or use illegal drugs. Some items may interact with your medicine.  What should I watch for while using this medicine?  Tell your doctor if your symptoms do not improve or if they get worse. If you have a high fever, skin rash, or headache, see your health care professional.  You may get drowsy or dizzy. Do not drive, use machinery, or do anything that needs mental alertness until you know how this medicine affects you. Do not sit or stand up quickly, especially if you are an older patient. This reduces the risk of dizzy or fainting spells.  What side effects may I notice from receiving this medicine?  Side effects that you should report to your doctor or health care professional as soon as possible:  · allergic reactions like skin rash, itching or hives, swelling of the face, lips, or tongue  · breathing problems  · chest pain  · confusion or hallucinations  · irregular heartbeat  · numbness of mouth or throat  · seizures  Side effects that usually do not require medical attention (report to your doctor or health care professional if they continue or are bothersome):  · burning feeling in the eyes  · constipation  · headache  · nasal congestion  · stomach upset  This list may not describe all possible side effects. Call your doctor for medical advice about side effects. You may report side effects to FDA at 3-818-GSE-9449.  Where should I keep my medicine?  Keep out of the reach  of children.  Store at room temperature between 15 and 30 degrees C (59 and 86 degrees F). Keep tightly closed. Protect from light and moisture. Throw away any unused medicine after the expiration date.  NOTE: This sheet is a summary. It may not cover all possible information. If you have questions about this medicine, talk to your doctor, pharmacist, or health care provider.  © 2020 Elsevier/Gold Standard (2009-03-18 14:52:56)    Amoxicillin capsules or tablets  What is this medicine?  AMOXICILLIN (a mox i ALFIE in) is a penicillin antibiotic. It is used to treat certain kinds of bacterial infections. It will not work for colds, flu, or other viral infections.  This medicine may be used for other purposes; ask your health care provider or pharmacist if you have questions.  COMMON BRAND NAME(S): Amoxil, Moxilin, Sumox, Trimox  What should I tell my health care provider before I take this medicine?  They need to know if you have any of these conditions:  · kidney disease  · an unusual or allergic reaction to amoxicillin, other penicillins, cephalosporin antibiotics, other medicines, foods, dyes, or preservatives  · pregnant or trying to get pregnant  · breast-feeding  How should I use this medicine?  Take this medicine by mouth with a glass of water. Follow the directions on your prescription label. You can take it with or without food. If it upsets your stomach, take it with food. Take your medicine at regular intervals. Do not take your medicine more often than directed. Take all of your medicine as directed even if you think you are better. Do not skip doses or stop your medicine early.  Talk to your pediatrician regarding the use of this medicine in children. While this drug may be prescribed for selected conditions, precautions do apply.  Overdosage: If you think you have taken too much of this medicine contact a poison control center or emergency room at once.  NOTE: This medicine is only for you. Do not share  this medicine with others.  What if I miss a dose?  If you miss a dose, take it as soon as you can. If it is almost time for your next dose, take only that dose. Do not take double or extra doses.  What may interact with this medicine?  · allopurinol  · birth control pills  · certain antibiotics like chloramphenicol, erythromycin, sulfamethoxazole, tetracycline  · certain medicines that treat or prevent blood clots like warfarin  This list may not describe all possible interactions. Give your health care provider a list of all the medicines, herbs, non-prescription drugs, or dietary supplements you use. Also tell them if you smoke, drink alcohol, or use illegal drugs. Some items may interact with your medicine.  What should I watch for while using this medicine?  Tell your health care professional if your symptoms do not start to get better or if they get worse.  Do not treat diarrhea with over the counter products. Contact your health care professional if you have diarrhea that lasts more than 2 days or if it is severe and watery.  If you have diabetes, you may get a false-positive result for sugar in your urine. Check with your health care professional.  Birth control may not work properly while you are taking this medicine. Talk to your health care professional about using an extra method of birth control.  This medicine may cause serious skin reactions. They can happen weeks to months after starting the medicine. Contact your health care provider right away if you notice fevers or flu-like symptoms with a rash. The rash may be red or purple and then turn into blisters or peeling of the skin. Or, you might notice a red rash with swelling of the face, lips or lymph nodes in your neck or under your arms.  What side effects may I notice from receiving this medicine?  Side effects that you should report to your doctor or health care professional as soon as possible:  · allergic reactions like skin rash, itching or  hives, swelling of the face, lips, or tongue  · bloody or watery diarrhea  · breathing problems  · feeling faint; lightheaded, falls  · fever  · redness, blistering, peeling or loosening of the skin, including inside the mouth  · seizures  · signs and symptoms of kidney injury like trouble passing urine or change in the amount of urine  · signs and symptoms of liver injury like dark yellow or brown urine; general ill feeling or flu-like symptoms; light-colored stools; loss of appetite; nausea; right upper belly pain; unusually weak or tired; yellowing of the eyes or skin  · unusual bleeding or bruising  · unusually weak or tired  Side effects that usually do not require medical attention (report to your doctor or health care professional if they continue or are bothersome):  · anxious  · confusion  · diarrhea  · dizziness  · headache  · nausea, vomiting  · stomach upset  · trouble sleeping  This list may not describe all possible side effects. Call your doctor for medical advice about side effects. You may report side effects to FDA at 3-330-ARB-1834.  Where should I keep my medicine?  Keep out of the reach of children.  Store at room temperature between 20 and 25 degrees C (68 and 77 degrees F). Throw away any unused medicine after the expiration date.  NOTE: This sheet is a summary. It may not cover all possible information. If you have questions about this medicine, talk to your doctor, pharmacist, or health care provider.  © 2020 Elsevier/Gold Standard (2020-02-28 14:32:29)     Dexamethasone tablets  What is this medicine?  DEXAMETHASONE (dex a METH a sone) is a corticosteroid. It is commonly used to treat inflammation of the skin, joints, lungs, and other organs. Common conditions treated include asthma, allergies, and arthritis. It is also used for other conditions, such as blood disorders and diseases of the adrenal glands.  This medicine may be used for other purposes; ask your health care provider or  pharmacist if you have questions.  COMMON BRAND NAME(S): CUSHINGS SYNDROME DIAGNOSTIC, Decadron, Dexabliss, DexPak Jr TaperPak, DexPak TaperPak, Dxevo, HiDex, TaperDex, Zema-Uche, ZoDex, ZonaCort 11 Day, ZonaCort 7 Day  What should I tell my health care provider before I take this medicine?  They need to know if you have any of these conditions:  · Cushing's syndrome  · diabetes  · glaucoma  · heart problems or disease  · high blood pressure  · infection like herpes, measles, tuberculosis, or chickenpox  · kidney disease  · liver disease  · mental problems  · myasthenia gravis  · osteoporosis  · previous heart attack  · seizures  · stomach, ulcer or intestine disease including colitis and diverticulitis  · thyroid problem  · an unusual or allergic reaction to dexamethasone, corticosteroids, other medicines, lactose, foods, dyes, or preservatives  · pregnant or trying to get pregnant  · breast-feeding  How should I use this medicine?  Take this medicine by mouth with a drink of water. Follow the directions on the prescription label. Take it with food or milk to avoid stomach upset. If you are taking this medicine once a day, take it in the morning. Do not take more medicine than you are told to take. Do not suddenly stop taking your medicine because you may develop a severe reaction. Your doctor will tell you how much medicine to take. If your doctor wants you to stop the medicine, the dose may be slowly lowered over time to avoid any side effects.  Talk to your pediatrician regarding the use of this medicine in children. Special care may be needed.  Patients over 65 years old may have a stronger reaction and need a smaller dose.  Overdosage: If you think you have taken too much of this medicine contact a poison control center or emergency room at once.  NOTE: This medicine is only for you. Do not share this medicine with others.  What if I miss a dose?  If you miss a dose, take it as soon as you can. If it is almost  time for your next dose, talk to your doctor or health care professional. You may need to miss a dose or take an extra dose. Do not take double or extra doses without advice.  What may interact with this medicine?  Do not take this medicine with any of the following medications:  · mifepristone, RU-486  · vaccines  This medicine may also interact with the following medications:  · amphotericin B  · antibiotics like clarithromycin, erythromycin, and troleandomycin  · aspirin and aspirin-like drugs  · barbiturates like phenobarbital  · carbamazepine  · cholestyramine  · cholinesterase inhibitors like donepezil, galantamine, rivastigmine, and tacrine  · cyclosporine  · digoxin  · diuretics  · ephedrine  · female hormones, like estrogens or progestins and birth control pills  · indinavir  · isoniazid  · ketoconazole  · medicines for diabetes  · medicines that improve muscle tone or strength for conditions like myasthenia gravis  · NSAIDs, medicines for pain and inflammation, like ibuprofen or naproxen  · phenytoin  · rifampin  · thalidomide  · warfarin  This list may not describe all possible interactions. Give your health care provider a list of all the medicines, herbs, non-prescription drugs, or dietary supplements you use. Also tell them if you smoke, drink alcohol, or use illegal drugs. Some items may interact with your medicine.  What should I watch for while using this medicine?  Visit your doctor or health care professional for regular checks on your progress. If you are taking this medicine over a prolonged period, carry an identification card with your name and address, the type and dose of your medicine, and your doctor's name and address.  This medicine may increase your risk of getting an infection. Stay away from people who are sick. Tell your doctor or health care professional if you are around anyone with measles or chickenpox.  If you are going to have surgery, tell your doctor or health care  professional that you have taken this medicine within the last twelve months.  Ask your doctor or health care professional about your diet. You may need to lower the amount of salt you eat.  This medicine may increase blood sugar. Ask your healthcare provider if changes in diet or medicines are needed if you have diabetes.  What side effects may I notice from receiving this medicine?  Side effects that you should report to your doctor or health care professional as soon as possible:  · allergic reactions like skin rash, itching or hives, swelling of the face, lips, or tongue  · fever, sore throat, sneezing, cough, or other signs of infection, wounds that will not heal  · mental depression, mood swings, mistaken feelings of self importance or of being mistreated  · pain in hips, back, ribs, arms, shoulders, or legs  · redness, blistering, peeling or loosening of the skin, including inside the mouth  ·   signs and symptoms of high blood sugar such as being more thirsty or hungry or having to urinate more than normal. You may also feel very tired or have blurry vision.  · trouble passing urine  · swelling of feet or lower legs  · unusual bleeding or bruising  Side effects that usually do not require medical attention (report to your doctor or health care professional if they continue or are bothersome):  · headache  · nausea, vomiting  · skin problems, acne, thin and shiny skin  · weight gain  This list may not describe all possible side effects. Call your doctor for medical advice about side effects. You may report side effects to FDA at 8-723-GQI-2895.  Where should I keep my medicine?  Keep out of the reach of children.  Store at room temperature between 20 and 25 degrees C (68 and 77 degrees F). Protect from light. Throw away any unused medicine after the expiration date.  NOTE: This sheet is a summary. It may not cover all possible information. If you have questions about this medicine, talk to your doctor,  pharmacist, or health care provider.  © 2020 Elsevier/Gold Standard (2019-09-18 10:58:53)    Home Oxygen Use, Adult  When a medical condition keeps you from getting enough oxygen, your health care provider may instruct you to take extra oxygen at home. Your health care provider will let you know:  · When to take oxygen.  · For how long to take oxygen.  · How quickly oxygen should be delivered (flow rate), in liters per minute (LPM or L/M).  Home oxygen can be given through:  · A mask.  · A nasal cannula. This is a device or tube that goes in the nostrils.  · A transtracheal catheter. This is a small, flexible tube placed in the trachea.  · A tracheostomy. This is a surgically made opening in the trachea.  These devices are connected with tubing to an oxygen source, such as:  · A tank. Tanks hold oxygen in gas form. They must be replaced when the oxygen is used up.  · A liquid oxygen device. This holds oxygen in liquid form. It must be replaced when the oxygen is used up.  · An oxygen concentrator machine. This filters oxygen in the room. It uses electricity, so you must have a backup cylinder of oxygen in case the power goes out.  Supplies needed:  To use oxygen, you will need:  · A mask, nasal cannula, transtracheal catheter, or tracheostomy.  · An oxygen tank, a liquid oxygen device, or an oxygen concentrator.  · The tape that your health care provider recommends (optional).  If you use a transtracheal catheter and your prescribed flow rate is 1 LPM or greater, you will also need a humidifier.  Risks and complications  · Fire. This can happen if the oxygen is exposed to a heat source, flame, or spark.  · Injury to skin. This can happen if liquid oxygen touches your skin.  · Organ damage. This can happen if you get too little oxygen.  How to use oxygen  Your health care provider or a representative from your medical device company will show you how to use your oxygen device. Follow her or his instructions. The  instructions may look something like this:  1. Wash your hands.  2. If you use an oxygen concentrator, make sure it is plugged in.  3. Place one end of the tube into the port on the tank, device, or machine.  4. Place the mask over your nose and mouth. Or, place the nasal cannula and secure it with tape if instructed. If you use a tracheostomy or transtracheal catheter, connect it to the oxygen source as directed.  5. Make sure the liter-flow setting on the machine is at the level prescribed by your health care provider.  6. Turn on the machine or adjust the knob on the tank or device to the correct liter-flow setting.  7. When you are done, turn off and unplug the machine, or turn the knob to OFF.  How to clean and care for the oxygen supplies  Nasal cannula  · Clean it with a warm, wet cloth daily or as needed.  · Wash it with a liquid soap once a week.  · Rinse it thoroughly once or twice a week.  · Replace it every 2-4 weeks.  · If you have an infection, such as a cold or pneumonia, change the cannula when you get better.  Mask  · Replace it every 2-4 weeks.  · If you have an infection, such as a cold or pneumonia, change the mask when you get better.  Humidifier bottle  · Wash the bottle between each refill:  ? Wash it with soap and warm water.  ? Rinse it thoroughly.  ? Disinfect it and its top.  ? Air-dry it.  · Make sure it is dry before you refill it.  Oxygen concentrator  · Clean the air filter at least twice a week according to directions from your home medical equipment and service company.  · Wipe down the cabinet every day. To do this:  ? Unplug the unit.  ? Wipe down the cabinet with a damp cloth.  ? Dry the cabinet.  Other equipment  · Change any extra tubing every 1-3 months.  · Follow instructions from your health care provider about taking care of any other equipment.  Safety tips  Fire safety tips    · Keep your oxygen and oxygen supplies at least 5 ft away from sources of heat, flames, and  "morales at all times.  · Do not allow smoking near your oxygen. Put up \"no smoking\" signs in your home. Avoid smoking areas when in public.  · Do not use materials that can burn (are flammable) while you use oxygen.  · When you go to a restaurant with portable oxygen, ask to be seated in the nonsmoking section.  · Keep a fire extinguisher close by. Let your fire department know that you have oxygen in your home.  · Test your home smoke detectors regularly.  Traveling  · Secure your oxygen tank in the vehicle so that it does not move around. Follow instructions from your medical device company about how to safely secure your tank.  · Make sure you have enough oxygen for the amount of time you will be away from home.  · If you are planning air travel, contact the airline to find out if they allow the use of an approved portable oxygen concentrator. You may also need documents from your health care provider and medical device company before you travel.  General safety tips  · If you use an oxygen cylinder, make sure it is in a stand or secured to an object that will not move (fixed object).  · If you use liquid oxygen, make sure its container is kept upright.  · If you use an oxygen concentrator:  ? Tell your electric company. Make sure you are given priority service in the event that your power goes out.  ? Avoid using extension cords, if possible.  Follow these instructions at home:  · Use oxygen only as told by your health care provider.  · Do not use alcohol or other drugs that make you relax (sedating drugs) unless instructed. They can slow down your breathing rate and make it hard to get in enough oxygen.  · Know how and when to order a refill of oxygen.  · Always keep a spare tank of oxygen. Plan ahead for holidays when you may not be able to get a prescription filled.  · Use water-based lubricants on your lips or nostrils. Do not use oil-based products like petroleum jelly.  · To prevent skin irritation on your " cheeks or behind your ears, tuck some gauze under the tubing.  Contact a health care provider if:  · You get headaches often.  · You have shortness of breath.  · You have a lasting cough.  · You have anxiety.  · You are sleepy all the time.  · You develop an illness that affects your breathing.  · You cannot exercise at your regular level.  · You are restless.  · You have difficult or irregular breathing, and it is getting worse.  · You have a fever.  · You have persistent redness under your nose.  Get help right away if:  · You are confused.  · You have blue lips or fingernails.  · You are struggling to breathe.  Summary  · Your health care provider or a representative from your medical device company will show you how to use your oxygen device. Follow her or his instructions.  · If you use an oxygen concentrator, make sure it is plugged in.  · Make sure the liter-flow setting on the machine is at the level prescribed by your health care provider.  · Keep your oxygen and oxygen supplies at least 5 ft away from sources of heat, flames, and morales at all times.  This information is not intended to replace advice given to you by your health care provider. Make sure you discuss any questions you have with your health care provider.  Document Released: 03/09/2005 Document Revised: 06/06/2019 Document Reviewed: 07/11/2017  [employer] Patient Education © 2020 [employer]    Depression / Suicide Risk    As you are discharged from this [facility] facility, it is important to learn how to keep safe from harming yourself.    Recognize the warning signs:  · Abrupt changes in personality, positive or negative- including increase in energy   · Giving away possessions  · Change in eating patterns- significant weight changes-  positive or negative  · Change in sleeping patterns- unable to sleep or sleeping all the time   · Unwillingness or inability to communicate  · Depression  · Unusual sadness, discouragement and  loneliness  · Talk of wanting to die  · Neglect of personal appearance   · Rebelliousness- reckless behavior  · Withdrawal from people/activities they love  · Confusion- inability to concentrate     If you or a loved one observes any of these behaviors or has concerns about self-harm, here's what you can do:  · Talk about it- your feelings and reasons for harming yourself  · Remove any means that you might use to hurt yourself (examples: pills, rope, extension cords, firearm)  · Get professional help from the community (Mental Health, Substance Abuse, psychological counseling)  · Do not be alone:Call your Safe Contact- someone whom you trust who will be there for you.  · Call your local CRISIS HOTLINE 115-8444 or 598-237-5229  · Call your local Children's Mobile Crisis Response Team [place] (913) 030-4518 or www.[employer]  · Call the toll free National Suicide Prevention Hotlines   · National Suicide Prevention Lifeline 518-537-TZMT (9695)  · National Hope Line Network 800-SUICIDE (848-3805)

## 2021-08-21 NOTE — DISCHARGE PLANNING
Agency/Facility Name: [facility]  Spoke To: Karli  Outcome: Gave verbal referral to Karli. Karli stated that an on-call  will f/u with this DPA with processing and Approved Services information      @[id number]  Agency/Facility Name: [facility]  Spoke To: Juan  Outcome: Juan stated he would f/u with this DPA regarding Approved Service prices shortly    @[phone]  Agency/Facility Name: [facility]  Spoke To: Juan  Outcome: Juan stated that price for approved services is $120.00. Juan stated that pt must still provide credit card info for collateral . Pt is to call Juan at [facility] [phone] or Call Center at [phone]    @[phone]  Agency/Facility Name: [facility]  Spoke To: Avril  Outcome: Avril stated that she would contact Juan and have him f/u with this DPA      @[id number]  Agency/Facility Name: [facility]  Spoke To: Juan  Outcome: [place] phone signal and is now returning to the [place] area. Juan said he could make it to [facility] in 1 hour. Juan requested if transport tank and concentrator could be brought to bedside    @2001  Agency/Facility Name: [facility]  Spoke To: Juan  Outcome: Juan stated he would be able to take concentrator to pt's home for setup. Juan is 1 hour away from [place] on his way back at this time

## 2021-08-21 NOTE — DISCHARGE SUMMARY
Discharge Summary    CHIEF COMPLAINT ON ADMISSION  Chief Complaint   Patient presents with   • Flu Like Symptoms   • Nausea/Vomiting/Diarrhea   • Coronavirus Screening   • Shortness of Breath       Reason for Admission  Coronavirus Screening     Admission Date  8/20/2021    CODE STATUS  Full Code    HPI & HOSPITAL COURSE  This is a 46 y.o. female admitted on 8/20/2021 with COVID-19 pneumonia. She was treated with oxygen supplementation and steroids with improvement in overall symptoms. At discharge, she was evaluated and approved for home oxygen therapy.    Therefore, she is discharged in fair and stable condition to home with close outpatient follow-up.    The patient recovered much more quickly than anticipated on admission.    Discharge Date  8/21/21    FOLLOW UP ITEMS POST DISCHARGE  none    DISCHARGE DIAGNOSES  Active Problems:    Controlled type 2 diabetes mellitus without complication, without long-term current use of insulin (HCC) POA: Yes    Dyslipidemia associated with type 2 diabetes mellitus (HCC) POA: Yes    Acute respiratory disease due to COVID-19 virus POA: Yes    Tachycardia POA: Yes  Resolved Problems:    * No resolved hospital problems. *      FOLLOW UP  No future appointments.  No follow-up provider specified.    MEDICATIONS ON DISCHARGE     Medication List      START taking these medications      Instructions   amoxicillin-clavulanate 875-125 MG Tabs  Commonly known as: AUGMENTIN   Take 1 Tablet by mouth 2 times a day for 5 days.  Dose: 1 Tablet     benzonatate 100 MG Caps  Commonly known as: TESSALON   Take 1 Capsule by mouth 3 times a day as needed for Cough.  Dose: 100 mg     dexamethasone 6 MG Tabs  Start taking on: August 22, 2021  Commonly known as: DECADRON   Take 1 Tablet by mouth every day for 7 days.  Dose: 6 mg        CONTINUE taking these medications      Instructions   acetaminophen 500 MG Tabs  Commonly known as: TYLENOL   Take 1,000 mg by mouth every four hours as needed.  Dose:  1,000 mg            Allergies  Allergies   Allergen Reactions   • Nkda [No Known Drug Allergy]        DIET  Orders Placed This Encounter   Procedures   • Diet Order Diet: Regular     Standing Status:   Standing     Number of Occurrences:   1     Order Specific Question:   Diet:     Answer:   Regular [1]       ACTIVITY  As tolerated.  Weight bearing as tolerated    CONSULTATIONS  none    PROCEDURES  none    LABORATORY  Lab Results   Component Value Date    SODIUM 141 08/21/2021    POTASSIUM 4.3 08/21/2021    CHLORIDE 104 08/21/2021    CO2 24 08/21/2021    GLUCOSE 153 (H) 08/21/2021    BUN 13 08/21/2021    CREATININE 0.72 08/21/2021    CREATININE 0.80 03/14/2011    GLOMRATE >59 03/14/2011        Lab Results   Component Value Date    WBC 9.4 08/21/2021    HEMOGLOBIN 12.9 08/21/2021    HEMATOCRIT 40.1 08/21/2021    PLATELETCT 261 08/21/2021        Total time of the discharge process exceeds 20 minutes.

## 2021-08-21 NOTE — DISCHARGE PLANNING
Received Choice form at 1004  Agency/Facility Name: [facility]  Referral sent per Choice form @ 0256

## 2021-08-21 NOTE — PROGRESS NOTES
Bedside report received. Patient A&O x4. Patient does not report pain. Patient is on 1L NC.. POC discussed with patient. Patient verbalized understanding. Call light and belongings within reach. Bed locked and in lowest position, alarm and fall precautions in place.

## 2021-08-21 NOTE — CARE PLAN
Problem: Knowledge Deficit - Standard  Goal: Patient and family/care givers will demonstrate understanding of plan of care, disease process/condition, diagnostic tests and medications  8/21/2021 0912 by Cora Goff R.N.  Outcome: Progressing  8/21/2021 0912 by Cora Goff R.N.  Outcome: Progressing  Note: Educated on care plan.     Problem: Pain - Standard  Goal: Alleviation of pain or a reduction in pain to the patient’s comfort goal  Outcome: Progressing  Note: Educate patient on pain.   The patient is Watcher - Medium risk of patient condition declining or worsening    Shift Goals  Clinical Goals: Monitor oxygenation  Patient Goals: comfort    Progress made toward(s) clinical / shift goals:  Yes    Patient is not progressing towards the following goals:

## 2021-08-21 NOTE — DISCHARGE PLANNING
Anticipated Discharge Disposition: Home to her prior living arrangement with oxygen, [facility] with [facility], [employer].     Action: This RN,  was notified that patient will need oxygen upon discharge by Bedside RN and hospitalist RN. This RN,  called patient in room, spoke with patient, she stated she did not have the funds to pay for home oxygen. Patient is currently on 1L and is Covid positive. I was informed by Garrison MORELAND that [facility] [employer] company will accept [facility] for cost of oxygen. Choice referral faxed to [employer], for [facility], waiting on price to fax [facility].     Barriers to Discharge: oxygen delivery, [facility] for DME.     Plan: HCM to follow up with [employer] for pricing of oxygen, fax [facility] to [facility], patient was given the phone number of [facility] to call company when she arrives home.     Addendum, 98523: This RN,  received a message from Garrison MORELAND stating that [facility] will need a credit card from patient for collateral, and an [facility] for the cost of oxygen, 120.00. [facility] was approved by Leader on call, Shanna Townsend for 120.00, this RN,  faxed [facility] to Garrison MORELAND, I called patient Nancy Lester to inform her to call Juan from [facility] with a credit care, patient stated she would. Patient  also stated next month she will have medical insurance, she also has a c-pap machine at home, but not the mask, she stated her dog ruined it. Bedside RN notified of oxygen delivery.     1506: This RN,  asked Garrison MORELAND about oxygen delivery, per  will be at bedside in about an hour, 1607.  [place]  wanted to [place] off both portable and concentrator, however I informed Garrison that would not work, patient's father could not carry concentrator, and patient could not as well, she has Covid. This RN, Case  Manager notified the bedside RN, that oxygen tank will be delivered within the hour.     Sherice Capps RN,

## 2021-08-21 NOTE — PROGRESS NOTES
Received report from previous nurse, Jovani, regarding prior 12 hours.  POC reviewed with pt, white board updated, pt verbalized understanding, call light within reach.  Pt encouraged to call before getting up.  Bed in lowest position, treaded slippers on.

## 2021-08-22 NOTE — PROGRESS NOTES
Pt dc'd home with father.. IV and monitor removed; monitor room notified. Pt left unit via wheel chair with RN. Personal belongings with pt when leaving unit. Pt given discharge instructions prior to leaving unit including where to  prescriptions and when to follow-up; verbalizes understanding. Copy of discharge instructions with pt and in the chart.